# Patient Record
Sex: FEMALE | Race: WHITE | NOT HISPANIC OR LATINO
[De-identification: names, ages, dates, MRNs, and addresses within clinical notes are randomized per-mention and may not be internally consistent; named-entity substitution may affect disease eponyms.]

---

## 2017-10-11 ENCOUNTER — TRANSCRIPTION ENCOUNTER (OUTPATIENT)
Age: 32
End: 2017-10-11

## 2017-10-11 ENCOUNTER — OUTPATIENT (OUTPATIENT)
Dept: OUTPATIENT SERVICES | Facility: HOSPITAL | Age: 32
LOS: 1 days | End: 2017-10-11
Payer: COMMERCIAL

## 2017-10-11 VITALS
WEIGHT: 293 LBS | TEMPERATURE: 97 F | RESPIRATION RATE: 16 BRPM | HEART RATE: 80 BPM | SYSTOLIC BLOOD PRESSURE: 120 MMHG | DIASTOLIC BLOOD PRESSURE: 80 MMHG

## 2017-10-11 DIAGNOSIS — O02.1 MISSED ABORTION: ICD-10-CM

## 2017-10-11 DIAGNOSIS — Z01.818 ENCOUNTER FOR OTHER PREPROCEDURAL EXAMINATION: ICD-10-CM

## 2017-10-11 LAB
ANION GAP SERPL CALC-SCNC: 11 MMOL/L — SIGNIFICANT CHANGE UP (ref 5–17)
BASOPHILS # BLD AUTO: 0 K/UL — SIGNIFICANT CHANGE UP (ref 0–0.2)
BASOPHILS NFR BLD AUTO: 0.2 % — SIGNIFICANT CHANGE UP (ref 0–2)
BLD GP AB SCN SERPL QL: SIGNIFICANT CHANGE UP
BUN SERPL-MCNC: 11 MG/DL — SIGNIFICANT CHANGE UP (ref 8–20)
CALCIUM SERPL-MCNC: 9.2 MG/DL — SIGNIFICANT CHANGE UP (ref 8.6–10.2)
CHLORIDE SERPL-SCNC: 105 MMOL/L — SIGNIFICANT CHANGE UP (ref 98–107)
CO2 SERPL-SCNC: 22 MMOL/L — SIGNIFICANT CHANGE UP (ref 22–29)
CREAT SERPL-MCNC: 0.74 MG/DL — SIGNIFICANT CHANGE UP (ref 0.5–1.3)
EOSINOPHIL # BLD AUTO: 0.2 K/UL — SIGNIFICANT CHANGE UP (ref 0–0.5)
EOSINOPHIL NFR BLD AUTO: 1.8 % — SIGNIFICANT CHANGE UP (ref 0–6)
GLUCOSE SERPL-MCNC: 98 MG/DL — SIGNIFICANT CHANGE UP (ref 70–115)
HCT VFR BLD CALC: 38.3 % — SIGNIFICANT CHANGE UP (ref 37–47)
HGB BLD-MCNC: 13 G/DL — SIGNIFICANT CHANGE UP (ref 12–16)
LYMPHOCYTES # BLD AUTO: 24.3 % — SIGNIFICANT CHANGE UP (ref 20–55)
LYMPHOCYTES # BLD AUTO: 3.1 K/UL — SIGNIFICANT CHANGE UP (ref 1–4.8)
MCHC RBC-ENTMCNC: 28.8 PG — SIGNIFICANT CHANGE UP (ref 27–31)
MCHC RBC-ENTMCNC: 33.9 G/DL — SIGNIFICANT CHANGE UP (ref 32–36)
MCV RBC AUTO: 84.7 FL — SIGNIFICANT CHANGE UP (ref 81–99)
MONOCYTES # BLD AUTO: 0.8 K/UL — SIGNIFICANT CHANGE UP (ref 0–0.8)
MONOCYTES NFR BLD AUTO: 6.5 % — SIGNIFICANT CHANGE UP (ref 3–10)
NEUTROPHILS # BLD AUTO: 8.4 K/UL — HIGH (ref 1.8–8)
NEUTROPHILS NFR BLD AUTO: 66.9 % — SIGNIFICANT CHANGE UP (ref 37–73)
PLATELET # BLD AUTO: 237 K/UL — SIGNIFICANT CHANGE UP (ref 150–400)
POTASSIUM SERPL-MCNC: 4.2 MMOL/L — SIGNIFICANT CHANGE UP (ref 3.5–5.3)
POTASSIUM SERPL-SCNC: 4.2 MMOL/L — SIGNIFICANT CHANGE UP (ref 3.5–5.3)
RBC # BLD: 4.52 M/UL — SIGNIFICANT CHANGE UP (ref 4.4–5.2)
RBC # FLD: 12.9 % — SIGNIFICANT CHANGE UP (ref 11–15.6)
SODIUM SERPL-SCNC: 138 MMOL/L — SIGNIFICANT CHANGE UP (ref 135–145)
TYPE + AB SCN PNL BLD: SIGNIFICANT CHANGE UP
WBC # BLD: 12.6 K/UL — HIGH (ref 4.8–10.8)
WBC # FLD AUTO: 12.6 K/UL — HIGH (ref 4.8–10.8)

## 2017-10-11 PROCEDURE — 86850 RBC ANTIBODY SCREEN: CPT

## 2017-10-11 PROCEDURE — 86901 BLOOD TYPING SEROLOGIC RH(D): CPT

## 2017-10-11 PROCEDURE — 36415 COLL VENOUS BLD VENIPUNCTURE: CPT

## 2017-10-11 PROCEDURE — 85027 COMPLETE CBC AUTOMATED: CPT

## 2017-10-11 PROCEDURE — G0463: CPT

## 2017-10-11 PROCEDURE — 86900 BLOOD TYPING SEROLOGIC ABO: CPT

## 2017-10-11 PROCEDURE — 80048 BASIC METABOLIC PNL TOTAL CA: CPT

## 2017-10-11 RX ORDER — SODIUM CHLORIDE 9 MG/ML
3 INJECTION INTRAMUSCULAR; INTRAVENOUS; SUBCUTANEOUS ONCE
Qty: 0 | Refills: 0 | Status: DISCONTINUED | OUTPATIENT
Start: 2017-10-12 | End: 2017-10-12

## 2017-10-11 NOTE — H&P PST ADULT - NSANTHOSAYNRD_GEN_A_CORE
No. GEE screening performed.  STOP BANG Legend: 0-2 = LOW Risk; 3-4 = INTERMEDIATE Risk; 5-8 = HIGH Risk

## 2017-10-11 NOTE — H&P PST ADULT - HISTORY OF PRESENT ILLNESS
32 year old female presents with c/o of no heart beat on sonogram.  LMP 17 . Pt had sono 2 weeks ago , heartbeat noted but small for size. Repeat sono done 10/ 9/17 and no heartbeat noted. Denies any bleeding or cramping. Denies history of STD. Scheduled for D&C.

## 2017-10-12 ENCOUNTER — RESULT REVIEW (OUTPATIENT)
Age: 32
End: 2017-10-12

## 2017-10-12 ENCOUNTER — OUTPATIENT (OUTPATIENT)
Dept: OUTPATIENT SERVICES | Facility: HOSPITAL | Age: 32
LOS: 1 days | End: 2017-10-12
Payer: COMMERCIAL

## 2017-10-12 VITALS
HEART RATE: 86 BPM | SYSTOLIC BLOOD PRESSURE: 150 MMHG | WEIGHT: 293 LBS | OXYGEN SATURATION: 100 % | DIASTOLIC BLOOD PRESSURE: 91 MMHG | RESPIRATION RATE: 16 BRPM

## 2017-10-12 VITALS
SYSTOLIC BLOOD PRESSURE: 124 MMHG | DIASTOLIC BLOOD PRESSURE: 73 MMHG | RESPIRATION RATE: 16 BRPM | HEART RATE: 67 BPM | OXYGEN SATURATION: 100 %

## 2017-10-12 DIAGNOSIS — O02.1 MISSED ABORTION: ICD-10-CM

## 2017-10-12 DIAGNOSIS — Z01.818 ENCOUNTER FOR OTHER PREPROCEDURAL EXAMINATION: ICD-10-CM

## 2017-10-12 PROCEDURE — 88305 TISSUE EXAM BY PATHOLOGIST: CPT | Mod: 26

## 2017-10-12 PROCEDURE — 59820 CARE OF MISCARRIAGE: CPT

## 2017-10-12 PROCEDURE — 88305 TISSUE EXAM BY PATHOLOGIST: CPT

## 2017-10-12 NOTE — BRIEF OPERATIVE NOTE - PROCEDURE
<<-----Click on this checkbox to enter Procedure Exam under anesthesia, gynecologic  10/12/2017    Active  [email or web address]  Suction curettage of uterus  10/12/2017    Active  [email or web address]

## 2017-10-18 LAB — SURGICAL PATHOLOGY FINAL REPORT - CH: SIGNIFICANT CHANGE UP

## 2018-03-01 ENCOUNTER — EMERGENCY (EMERGENCY)
Facility: HOSPITAL | Age: 33
LOS: 0 days | Discharge: HOME | End: 2018-03-01
Attending: EMERGENCY MEDICINE | Admitting: EMERGENCY MEDICINE
Payer: COMMERCIAL

## 2018-03-01 VITALS
RESPIRATION RATE: 18 BRPM | TEMPERATURE: 98 F | HEART RATE: 100 BPM | OXYGEN SATURATION: 100 % | SYSTOLIC BLOOD PRESSURE: 139 MMHG | DIASTOLIC BLOOD PRESSURE: 99 MMHG

## 2018-03-01 VITALS — HEIGHT: 67 IN | WEIGHT: 293 LBS

## 2018-03-01 DIAGNOSIS — R07.9 CHEST PAIN, UNSPECIFIED: ICD-10-CM

## 2018-03-01 LAB
ALBUMIN SERPL ELPH-MCNC: 4 G/DL — SIGNIFICANT CHANGE UP (ref 3.3–5)
ALP SERPL-CCNC: 67 U/L — SIGNIFICANT CHANGE UP (ref 40–120)
ALT FLD-CCNC: 31 U/L — SIGNIFICANT CHANGE UP (ref 12–78)
ANION GAP SERPL CALC-SCNC: 7 MMOL/L — SIGNIFICANT CHANGE UP (ref 5–17)
APTT BLD: 31 SEC — SIGNIFICANT CHANGE UP (ref 27.5–37.4)
AST SERPL-CCNC: 19 U/L — SIGNIFICANT CHANGE UP (ref 15–37)
BASOPHILS # BLD AUTO: 0.1 K/UL — SIGNIFICANT CHANGE UP (ref 0–0.2)
BASOPHILS NFR BLD AUTO: 1.1 % — SIGNIFICANT CHANGE UP (ref 0–2)
BILIRUB SERPL-MCNC: 0.6 MG/DL — SIGNIFICANT CHANGE UP (ref 0.2–1.2)
BUN SERPL-MCNC: 12 MG/DL — SIGNIFICANT CHANGE UP (ref 7–23)
CALCIUM SERPL-MCNC: 9.5 MG/DL — SIGNIFICANT CHANGE UP (ref 8.5–10.1)
CHLORIDE SERPL-SCNC: 106 MMOL/L — SIGNIFICANT CHANGE UP (ref 96–108)
CO2 SERPL-SCNC: 25 MMOL/L — SIGNIFICANT CHANGE UP (ref 22–31)
CREAT SERPL-MCNC: 0.82 MG/DL — SIGNIFICANT CHANGE UP (ref 0.5–1.3)
EOSINOPHIL # BLD AUTO: 0.2 K/UL — SIGNIFICANT CHANGE UP (ref 0–0.5)
EOSINOPHIL NFR BLD AUTO: 2.6 % — SIGNIFICANT CHANGE UP (ref 0–6)
GLUCOSE SERPL-MCNC: 81 MG/DL — SIGNIFICANT CHANGE UP (ref 70–99)
HCT VFR BLD CALC: 43.6 % — SIGNIFICANT CHANGE UP (ref 34.5–45)
HGB BLD-MCNC: 14.7 G/DL — SIGNIFICANT CHANGE UP (ref 11.5–15.5)
INR BLD: 1.05 RATIO — SIGNIFICANT CHANGE UP (ref 0.88–1.16)
LYMPHOCYTES # BLD AUTO: 2.9 K/UL — SIGNIFICANT CHANGE UP (ref 1–3.3)
LYMPHOCYTES # BLD AUTO: 32.4 % — SIGNIFICANT CHANGE UP (ref 13–44)
MCHC RBC-ENTMCNC: 28 PG — SIGNIFICANT CHANGE UP (ref 27–34)
MCHC RBC-ENTMCNC: 33.7 GM/DL — SIGNIFICANT CHANGE UP (ref 32–36)
MCV RBC AUTO: 83.1 FL — SIGNIFICANT CHANGE UP (ref 80–100)
MONOCYTES # BLD AUTO: 0.6 K/UL — SIGNIFICANT CHANGE UP (ref 0–0.9)
MONOCYTES NFR BLD AUTO: 6.9 % — SIGNIFICANT CHANGE UP (ref 2–14)
NEUTROPHILS # BLD AUTO: 5.2 K/UL — SIGNIFICANT CHANGE UP (ref 1.8–7.4)
NEUTROPHILS NFR BLD AUTO: 57.1 % — SIGNIFICANT CHANGE UP (ref 43–77)
PLATELET # BLD AUTO: 273 K/UL — SIGNIFICANT CHANGE UP (ref 150–400)
POTASSIUM SERPL-MCNC: 4.1 MMOL/L — SIGNIFICANT CHANGE UP (ref 3.5–5.3)
POTASSIUM SERPL-SCNC: 4.1 MMOL/L — SIGNIFICANT CHANGE UP (ref 3.5–5.3)
PROT SERPL-MCNC: 7.9 GM/DL — SIGNIFICANT CHANGE UP (ref 6–8.3)
PROTHROM AB SERPL-ACNC: 11.4 SEC — SIGNIFICANT CHANGE UP (ref 9.8–12.7)
RBC # BLD: 5.25 M/UL — HIGH (ref 3.8–5.2)
RBC # FLD: 12.2 % — SIGNIFICANT CHANGE UP (ref 10.3–14.5)
SODIUM SERPL-SCNC: 138 MMOL/L — SIGNIFICANT CHANGE UP (ref 135–145)
TROPONIN I SERPL-MCNC: <0.015 NG/ML — SIGNIFICANT CHANGE UP (ref 0.01–0.04)
WBC # BLD: 9.1 K/UL — SIGNIFICANT CHANGE UP (ref 3.8–10.5)
WBC # FLD AUTO: 9.1 K/UL — SIGNIFICANT CHANGE UP (ref 3.8–10.5)

## 2018-03-01 PROCEDURE — 71046 X-RAY EXAM CHEST 2 VIEWS: CPT | Mod: 26

## 2018-03-01 PROCEDURE — 93010 ELECTROCARDIOGRAM REPORT: CPT

## 2018-03-01 PROCEDURE — 99285 EMERGENCY DEPT VISIT HI MDM: CPT

## 2018-03-01 RX ORDER — SODIUM CHLORIDE 9 MG/ML
3 INJECTION INTRAMUSCULAR; INTRAVENOUS; SUBCUTANEOUS ONCE
Qty: 0 | Refills: 0 | Status: COMPLETED | OUTPATIENT
Start: 2018-03-01 | End: 2018-03-01

## 2018-03-01 RX ADMIN — SODIUM CHLORIDE 3 MILLILITER(S): 9 INJECTION INTRAMUSCULAR; INTRAVENOUS; SUBCUTANEOUS at 14:14

## 2018-03-01 NOTE — ED STATDOCS - OBJECTIVE STATEMENT
31 y/o f with PMHx of anxiety presenting to the ED c/o CP x2 days. Pt describes pain as tightness and pressure. +indigestion.  Pt took .25 mg Xanex this morning with no relief to sx. Seen by school nurse who stated her BP was elevated. Denies cough, fever, swelling or pain in legs. Nonsmoker, No cardiac FHx, No recent travel, not on any oral contraceptive.

## 2018-03-01 NOTE — ED ADULT NURSE NOTE - DOES PATIENT HAVE ADVANCE DIRECTIVE
Pre-Visit Chart Review  For Appointment Scheduled on 5/3/17.    Health Maintenance Due   Topic Date Due    TETANUS VACCINE  08/11/1994    Pneumococcal PPSV23 (Medium Risk) (1) 08/11/1994    Mammogram  08/11/2016                     
No

## 2018-03-01 NOTE — ED ADULT NURSE NOTE - OBJECTIVE STATEMENT
Pt presents with chest pain since last night. Denies SOB or dizziness. Pt states " on and off for the last week I have been having palpitations"

## 2018-03-01 NOTE — ED STATDOCS - NS_ ATTENDINGSCRIBEDETAILS _ED_A_ED_FT
I, Eugene Caceres MD,  performed the initial face to face bedside interview with this patient regarding history of present illness, review of symptoms and relevant past medical, social and family history.  I completed an independent physical examination.    The history, relevant review of systems, past medical and surgical history, medical decision making, and physical examination was documented by the scribe in my presence and I attest to the accuracy of the documentation.

## 2018-03-01 NOTE — ED STATDOCS - ATTENDING CONTRIBUTION TO CARE
I, Eugene Caceres MD,  performed the initial face to face bedside interview with this patient regarding history of present illness, review of symptoms and relevant past medical, social and family history.  I completed an independent physical examination.  I was the initial provider who evaluated this patient. I have signed out the follow up of any pending tests (i.e. labs, radiological studies) to the ACP.  I have communicated the patient’s plan of care and disposition with the ACP.

## 2018-03-01 NOTE — ED STATDOCS - PROGRESS NOTE DETAILS
VENESSA Lund:   Patient has been seen, evaluated and orders have been written by the attending in intake. Patient is stable.  I will follow up the results of orders written and I will continue to evaluate/observe the patient.  Will obtain labs/CXR/reassess.  Marcelina Lund PA-C Reevaluated patient at bedside.  Patient feeling much improved.  Discussed the results of all diagnostic testing in ED and copies of all reports given.   An opportunity to ask questions was given.  Discussed the importance of prompt, close medical follow-up.  Patient will return with any changes, concerns or persistent / worsening symptoms.  Understanding of all instructions verbalized.  Pt. to follow up with cardiology.  Marcelina Lund PA-C

## 2018-06-01 PROBLEM — Z00.00 ENCOUNTER FOR PREVENTIVE HEALTH EXAMINATION: Noted: 2018-06-01

## 2018-07-27 ENCOUNTER — APPOINTMENT (OUTPATIENT)
Dept: DERMATOLOGY | Facility: CLINIC | Age: 33
End: 2018-07-27
Payer: COMMERCIAL

## 2018-07-27 VITALS — WEIGHT: 293 LBS | BODY MASS INDEX: 45.99 KG/M2 | HEIGHT: 67 IN

## 2018-07-27 DIAGNOSIS — D22.5 MELANOCYTIC NEVI OF TRUNK: ICD-10-CM

## 2018-07-27 DIAGNOSIS — D18.00 HEMANGIOMA UNSPECIFIED SITE: ICD-10-CM

## 2018-07-27 DIAGNOSIS — D23.9 OTHER BENIGN NEOPLASM OF SKIN, UNSPECIFIED: ICD-10-CM

## 2018-07-27 PROCEDURE — 99203 OFFICE O/P NEW LOW 30 MIN: CPT

## 2018-09-11 ENCOUNTER — ASOB RESULT (OUTPATIENT)
Age: 33
End: 2018-09-11

## 2018-09-11 ENCOUNTER — APPOINTMENT (OUTPATIENT)
Dept: ANTEPARTUM | Facility: CLINIC | Age: 33
End: 2018-09-11

## 2018-09-11 ENCOUNTER — APPOINTMENT (OUTPATIENT)
Dept: MATERNAL FETAL MEDICINE | Facility: CLINIC | Age: 33
End: 2018-09-11
Payer: COMMERCIAL

## 2018-09-11 ENCOUNTER — APPOINTMENT (OUTPATIENT)
Dept: ANTEPARTUM | Facility: CLINIC | Age: 33
End: 2018-09-11
Payer: COMMERCIAL

## 2018-09-11 VITALS
BODY MASS INDEX: 45.99 KG/M2 | SYSTOLIC BLOOD PRESSURE: 132 MMHG | HEIGHT: 67 IN | DIASTOLIC BLOOD PRESSURE: 68 MMHG | WEIGHT: 293 LBS

## 2018-09-11 DIAGNOSIS — Z78.9 OTHER SPECIFIED HEALTH STATUS: ICD-10-CM

## 2018-09-11 DIAGNOSIS — Z82.49 FAMILY HISTORY OF ISCHEMIC HEART DISEASE AND OTHER DISEASES OF THE CIRCULATORY SYSTEM: ICD-10-CM

## 2018-09-11 DIAGNOSIS — Z83.49 FAMILY HISTORY OF OTHER ENDOCRINE, NUTRITIONAL AND METABOLIC DISEASES: ICD-10-CM

## 2018-09-11 PROCEDURE — 76811 OB US DETAILED SNGL FETUS: CPT

## 2018-09-11 PROCEDURE — 76817 TRANSVAGINAL US OBSTETRIC: CPT

## 2018-09-11 PROCEDURE — 99243 OFF/OP CNSLTJ NEW/EST LOW 30: CPT

## 2018-09-19 ENCOUNTER — APPOINTMENT (OUTPATIENT)
Dept: MATERNAL FETAL MEDICINE | Facility: CLINIC | Age: 33
End: 2018-09-19

## 2018-09-19 ENCOUNTER — APPOINTMENT (OUTPATIENT)
Dept: ANTEPARTUM | Facility: CLINIC | Age: 33
End: 2018-09-19

## 2018-10-08 ENCOUNTER — APPOINTMENT (OUTPATIENT)
Dept: ANTEPARTUM | Facility: CLINIC | Age: 33
End: 2018-10-08

## 2018-10-08 ENCOUNTER — APPOINTMENT (OUTPATIENT)
Dept: PEDIATRIC CARDIOLOGY | Facility: CLINIC | Age: 33
End: 2018-10-08
Payer: COMMERCIAL

## 2018-10-08 ENCOUNTER — APPOINTMENT (OUTPATIENT)
Dept: MATERNAL FETAL MEDICINE | Facility: CLINIC | Age: 33
End: 2018-10-08

## 2018-10-08 PROCEDURE — 76827 ECHO EXAM OF FETAL HEART: CPT

## 2018-10-08 PROCEDURE — 76825 ECHO EXAM OF FETAL HEART: CPT

## 2018-10-08 PROCEDURE — 99203 OFFICE O/P NEW LOW 30 MIN: CPT | Mod: 25

## 2018-10-08 PROCEDURE — 93325 DOPPLER ECHO COLOR FLOW MAPG: CPT

## 2018-11-06 ENCOUNTER — APPOINTMENT (OUTPATIENT)
Dept: ANTEPARTUM | Facility: CLINIC | Age: 33
End: 2018-11-06
Payer: COMMERCIAL

## 2018-11-06 ENCOUNTER — ASOB RESULT (OUTPATIENT)
Age: 33
End: 2018-11-06

## 2018-11-06 PROCEDURE — 76816 OB US FOLLOW-UP PER FETUS: CPT

## 2018-12-14 ENCOUNTER — RECORD ABSTRACTING (OUTPATIENT)
Age: 33
End: 2018-12-14

## 2018-12-14 DIAGNOSIS — L68.0 HIRSUTISM: ICD-10-CM

## 2018-12-14 DIAGNOSIS — Z87.59 PERSONAL HISTORY OF OTHER COMPLICATIONS OF PREGNANCY, CHILDBIRTH AND THE PUERPERIUM: ICD-10-CM

## 2018-12-14 DIAGNOSIS — Z80.8 FAMILY HISTORY OF MALIGNANT NEOPLASM OF OTHER ORGANS OR SYSTEMS: ICD-10-CM

## 2018-12-14 DIAGNOSIS — M72.2 PLANTAR FASCIAL FIBROMATOSIS: ICD-10-CM

## 2018-12-14 LAB — CYTOLOGY CVX/VAG DOC THIN PREP: NORMAL

## 2018-12-17 ENCOUNTER — EMERGENCY (EMERGENCY)
Facility: HOSPITAL | Age: 33
LOS: 1 days | Discharge: HOME | End: 2018-12-17
Attending: EMERGENCY MEDICINE
Payer: COMMERCIAL

## 2018-12-17 ENCOUNTER — APPOINTMENT (OUTPATIENT)
Dept: OBGYN | Facility: CLINIC | Age: 33
End: 2018-12-17
Payer: COMMERCIAL

## 2018-12-17 ENCOUNTER — APPOINTMENT (OUTPATIENT)
Dept: OBGYN | Facility: CLINIC | Age: 33
End: 2018-12-17

## 2018-12-17 VITALS
HEIGHT: 67 IN | SYSTOLIC BLOOD PRESSURE: 124 MMHG | DIASTOLIC BLOOD PRESSURE: 80 MMHG | BODY MASS INDEX: 45.99 KG/M2 | WEIGHT: 293 LBS

## 2018-12-17 VITALS
HEIGHT: 67 IN | TEMPERATURE: 98 F | HEART RATE: 84 BPM | WEIGHT: 293 LBS | DIASTOLIC BLOOD PRESSURE: 64 MMHG | SYSTOLIC BLOOD PRESSURE: 122 MMHG | OXYGEN SATURATION: 99 % | RESPIRATION RATE: 20 BRPM

## 2018-12-17 LAB
ANION GAP SERPL CALC-SCNC: 11 MMOL/L — SIGNIFICANT CHANGE UP (ref 5–17)
BASOPHILS # BLD AUTO: 0 K/UL — SIGNIFICANT CHANGE UP (ref 0–0.2)
BASOPHILS NFR BLD AUTO: 0.2 % — SIGNIFICANT CHANGE UP (ref 0–2)
BILIRUB UR QL STRIP: NORMAL
BUN SERPL-MCNC: 8 MG/DL — SIGNIFICANT CHANGE UP (ref 8–20)
CALCIUM SERPL-MCNC: 8.8 MG/DL — SIGNIFICANT CHANGE UP (ref 8.6–10.2)
CHLORIDE SERPL-SCNC: 104 MMOL/L — SIGNIFICANT CHANGE UP (ref 98–107)
CO2 SERPL-SCNC: 22 MMOL/L — SIGNIFICANT CHANGE UP (ref 22–29)
COLLECTION METHOD: NORMAL
CREAT SERPL-MCNC: 0.55 MG/DL — SIGNIFICANT CHANGE UP (ref 0.5–1.3)
EOSINOPHIL # BLD AUTO: 0.2 K/UL — SIGNIFICANT CHANGE UP (ref 0–0.5)
EOSINOPHIL NFR BLD AUTO: 1.8 % — SIGNIFICANT CHANGE UP (ref 0–6)
GLUCOSE SERPL-MCNC: 107 MG/DL — SIGNIFICANT CHANGE UP (ref 70–115)
GLUCOSE UR-MCNC: NORMAL
HCG UR QL: 0.2 EU/DL
HCT VFR BLD CALC: 32.1 % — LOW (ref 37–47)
HGB BLD-MCNC: 10.3 G/DL — LOW (ref 12–16)
HGB UR QL STRIP.AUTO: NORMAL
KETONES UR-MCNC: NORMAL
LEUKOCYTE ESTERASE UR QL STRIP: ABNORMAL
LYMPHOCYTES # BLD AUTO: 1.8 K/UL — SIGNIFICANT CHANGE UP (ref 1–4.8)
LYMPHOCYTES # BLD AUTO: 19.7 % — LOW (ref 20–55)
MCHC RBC-ENTMCNC: 27 PG — SIGNIFICANT CHANGE UP (ref 27–31)
MCHC RBC-ENTMCNC: 32.1 G/DL — SIGNIFICANT CHANGE UP (ref 32–36)
MCV RBC AUTO: 84 FL — SIGNIFICANT CHANGE UP (ref 81–99)
MONOCYTES # BLD AUTO: 0.7 K/UL — SIGNIFICANT CHANGE UP (ref 0–0.8)
MONOCYTES NFR BLD AUTO: 7.4 % — SIGNIFICANT CHANGE UP (ref 3–10)
NEUTROPHILS # BLD AUTO: 6.6 K/UL — SIGNIFICANT CHANGE UP (ref 1.8–8)
NEUTROPHILS NFR BLD AUTO: 70.4 % — SIGNIFICANT CHANGE UP (ref 37–73)
NITRITE UR QL STRIP: NORMAL
PH UR STRIP: 7
PLATELET # BLD AUTO: 218 K/UL — SIGNIFICANT CHANGE UP (ref 150–400)
POTASSIUM SERPL-MCNC: 4 MMOL/L — SIGNIFICANT CHANGE UP (ref 3.5–5.3)
POTASSIUM SERPL-SCNC: 4 MMOL/L — SIGNIFICANT CHANGE UP (ref 3.5–5.3)
PROT UR STRIP-MCNC: NORMAL
RBC # BLD: 3.82 M/UL — LOW (ref 4.4–5.2)
RBC # FLD: 13.7 % — SIGNIFICANT CHANGE UP (ref 11–15.6)
SODIUM SERPL-SCNC: 137 MMOL/L — SIGNIFICANT CHANGE UP (ref 135–145)
SP GR UR STRIP: 1.01
T3 SERPL-MCNC: 175 NG/DL — SIGNIFICANT CHANGE UP (ref 80–200)
T4 AB SER-ACNC: 7.5 UG/DL — SIGNIFICANT CHANGE UP (ref 4.5–12)
TSH SERPL-MCNC: 1.64 UIU/ML — SIGNIFICANT CHANGE UP (ref 0.27–4.2)
WBC # BLD: 9.3 K/UL — SIGNIFICANT CHANGE UP (ref 4.8–10.8)
WBC # FLD AUTO: 9.3 K/UL — SIGNIFICANT CHANGE UP (ref 4.8–10.8)

## 2018-12-17 PROCEDURE — 0502F SUBSEQUENT PRENATAL CARE: CPT

## 2018-12-17 PROCEDURE — 99284 EMERGENCY DEPT VISIT MOD MDM: CPT | Mod: 25

## 2018-12-17 PROCEDURE — 70551 MRI BRAIN STEM W/O DYE: CPT | Mod: 26

## 2018-12-17 PROCEDURE — 85027 COMPLETE CBC AUTOMATED: CPT

## 2018-12-17 PROCEDURE — 84443 ASSAY THYROID STIM HORMONE: CPT

## 2018-12-17 PROCEDURE — 36415 COLL VENOUS BLD VENIPUNCTURE: CPT

## 2018-12-17 PROCEDURE — 96375 TX/PRO/DX INJ NEW DRUG ADDON: CPT

## 2018-12-17 PROCEDURE — 70551 MRI BRAIN STEM W/O DYE: CPT

## 2018-12-17 PROCEDURE — 99284 EMERGENCY DEPT VISIT MOD MDM: CPT

## 2018-12-17 PROCEDURE — 84436 ASSAY OF TOTAL THYROXINE: CPT

## 2018-12-17 PROCEDURE — 96374 THER/PROPH/DIAG INJ IV PUSH: CPT

## 2018-12-17 PROCEDURE — 81003 URINALYSIS AUTO W/O SCOPE: CPT | Mod: QW

## 2018-12-17 PROCEDURE — 80048 BASIC METABOLIC PNL TOTAL CA: CPT

## 2018-12-17 PROCEDURE — 84480 ASSAY TRIIODOTHYRONINE (T3): CPT

## 2018-12-17 RX ORDER — DIPHENHYDRAMINE HCL 50 MG
25 CAPSULE ORAL ONCE
Qty: 0 | Refills: 0 | Status: COMPLETED | OUTPATIENT
Start: 2018-12-17 | End: 2018-12-17

## 2018-12-17 RX ORDER — SODIUM CHLORIDE 9 MG/ML
1000 INJECTION INTRAMUSCULAR; INTRAVENOUS; SUBCUTANEOUS ONCE
Qty: 0 | Refills: 0 | Status: COMPLETED | OUTPATIENT
Start: 2018-12-17 | End: 2018-12-17

## 2018-12-17 RX ORDER — METOCLOPRAMIDE HCL 10 MG
10 TABLET ORAL ONCE
Qty: 0 | Refills: 0 | Status: COMPLETED | OUTPATIENT
Start: 2018-12-17 | End: 2018-12-17

## 2018-12-17 RX ADMIN — SODIUM CHLORIDE 1000 MILLILITER(S): 9 INJECTION INTRAMUSCULAR; INTRAVENOUS; SUBCUTANEOUS at 16:56

## 2018-12-17 RX ADMIN — Medication 10 MILLIGRAM(S): at 16:56

## 2018-12-17 RX ADMIN — Medication 25 MILLIGRAM(S): at 16:56

## 2018-12-17 NOTE — ED STATDOCS - ATTENDING CONTRIBUTION TO CARE
I, Kyree Story, performed the initial face to face bedside interview with this patient regarding history of present illness, review of symptoms and relevant past medical, social and family history.  I completed an independent physical examination.  I was the initial provider who evaluated this patient. I have signed out the follow up of any pending tests (i.e. labs, radiological studies) to the ACP.  I have communicated the patient’s plan of care and disposition with the ACP.

## 2018-12-17 NOTE — ED STATDOCS - MEDICAL DECISION MAKING DETAILS
will treat symptomatically for HA with Reglan and Benadryl, check labs and MRI of brain, re evaluate will treat symptomatically for HA with Reglan and Benadryl, check labs and MRI of brain, re evaluate. neurologist f/u as out pt.

## 2018-12-17 NOTE — ED STATDOCS - OBJECTIVE STATEMENT
34 y/o F pt presents to ED c/o intermittent HA that onset 3 days ago. Pt is 34 weeks pregnant. She notes the HA is intensified with coughing, sneezing, standing up, and lasts for 5 minutes; however, when the HA goes away, she describes the pain as "dull" lasting 2-3 hours. Notes the HA is located in the left occipital region Pt saw OBGYN and was referred to ED. Reports she currently has mild HA. She has never experienced these sx before, no hx of migraine or seizure. She is a non-smoker, no EtOH. Pt took Tylenol this morning at 09:00 which provided no relief. Denies hx thyroid problems, DM, HTN, FHx of CVA,  generalized weakness, difficulty speaking, blurred vision, fever, chills. No further acute complaints at this time. 34 y/o F pt presents to ED c/o intermittent HA that onset 3 days ago. Pt is 34 weeks pregnant. She notes the HA is intensified with coughing, sneezing, standing up, and lasts for 5 minutes; however, when the HA goes away, she describes the pain as "dull" lasting 2-3 hours. Notes the HA is located in the left occipital region Pt saw OBGYN and was referred to ED. Reports she currently has mild HA. She has never experienced these sx before, no hx of migraine or seizure. She is a non-smoker, no EtOH. Pt took Tylenol this morning at 09:00 which provided no relief. Denies hx thyroid problems, DM, HTN, FHx of CVA, generalized weakness, difficulty speaking, blurred vision, fever, chills. No further acute complaints at this time. 32 y/o F pt presents to ED c/o intermittent HA that onset 3 days ago. Pt is 34 weeks pregnant. She notes the HA is intensified with coughing, sneezing, standing up, and lasts for 5 minutes; however, when the HA goes away, she describes the pain as "dull" lasting 2-3 hours. Notes the HA is located in the left occipital region. Pt saw OBGYN and was referred to ED. Reports she currently has mild HA. She has never experienced these sx before, no hx of migraine or seizure. She is a non-smoker, no EtOH. Pt took Tylenol this morning at 09:00 which provided no relief. Denies hx thyroid problems, DM, HTN, FHx of CVA, generalized weakness, difficulty speaking, blurred vision, fever, chills. No further acute complaints at this time.

## 2018-12-17 NOTE — ED STATDOCS - NEUROLOGICAL, MLM
sensation is normal and strength is normal. sensation is normal and strength is normal. CN 2-12 GI, FTN Intact, no visual field deficit, patella refex +0, no pronator drift,

## 2018-12-17 NOTE — ED ADULT NURSE NOTE - NSIMPLEMENTINTERV_GEN_ALL_ED
Implemented All Universal Safety Interventions:  [place] to call system. Call bell, personal items and telephone within reach. Instruct patient to call for assistance. Room bathroom lighting operational. Non-slip footwear when patient is off stretcher. Physically safe environment: no spills, clutter or unnecessary equipment. Stretcher in lowest position, wheels locked, appropriate side rails in place.

## 2018-12-17 NOTE — ED ADULT TRIAGE NOTE - CHIEF COMPLAINT QUOTE
34 weeks pregnant and headaches. No pregnancy or BP problems in the office. I called L & [facility], they said medical clear first. MD sent for possible brain CT. Started Friday. Taking Tylenol. Has had a cough and congestion also

## 2018-12-17 NOTE — ED ADULT NURSE NOTE - OBJECTIVE STATEMENT
pt A+Ox3, no apparent distress noted. pt A+Ox3, no apparent distress noted. pt states 34 wks gestation, no complications during pregnancy. pt states she has had a headache x3 days, worsens when she coughs or sneezes. pt went to her OB/GYN today and was sent here, pt states headache is unrelated to pregnancy.

## 2018-12-17 NOTE — ED STATDOCS - PROGRESS NOTE DETAILS
pt is inially seen by Dr trent -agreed with Hx-ROS-PE and plan- Will F/u with result   pt now rated the headache 1-2 /10

## 2019-01-03 ENCOUNTER — APPOINTMENT (OUTPATIENT)
Dept: OBGYN | Facility: CLINIC | Age: 34
End: 2019-01-03

## 2019-01-03 VITALS — BODY MASS INDEX: 53.45 KG/M2 | WEIGHT: 293 LBS

## 2019-01-07 ENCOUNTER — OTHER (OUTPATIENT)
Age: 34
End: 2019-01-07

## 2019-01-07 ENCOUNTER — ASOB RESULT (OUTPATIENT)
Age: 34
End: 2019-01-07

## 2019-01-07 ENCOUNTER — APPOINTMENT (OUTPATIENT)
Dept: OBGYN | Facility: CLINIC | Age: 34
End: 2019-01-07
Payer: COMMERCIAL

## 2019-01-07 ENCOUNTER — TRANSCRIPTION ENCOUNTER (OUTPATIENT)
Age: 34
End: 2019-01-07

## 2019-01-07 VITALS
BODY MASS INDEX: 45.99 KG/M2 | WEIGHT: 293 LBS | HEIGHT: 67 IN | SYSTOLIC BLOOD PRESSURE: 120 MMHG | DIASTOLIC BLOOD PRESSURE: 80 MMHG

## 2019-01-07 LAB
B-HEM STREP SPEC QL CULT: NORMAL
BILIRUB UR QL STRIP: NORMAL
BILIRUB UR QL STRIP: NORMAL
CLARITY UR: CLEAR
COLLECTION METHOD: NORMAL
COLLECTION METHOD: NORMAL
GLUCOSE UR-MCNC: NORMAL
GLUCOSE UR-MCNC: NORMAL
HCG UR QL: 0.2 EU/DL
HCG UR QL: 0.2 EU/DL
HGB UR QL STRIP.AUTO: NORMAL
HGB UR QL STRIP.AUTO: NORMAL
HIV1+2 AB SPEC QL IA.RAPID: NONREACTIVE
KETONES UR-MCNC: NORMAL
KETONES UR-MCNC: NORMAL
LEUKOCYTE ESTERASE UR QL STRIP: ABNORMAL
LEUKOCYTE ESTERASE UR QL STRIP: ABNORMAL
NITRITE UR QL STRIP: NORMAL
NITRITE UR QL STRIP: NORMAL
PH UR STRIP: 6.5
PH UR STRIP: 7
PROT UR STRIP-MCNC: NORMAL
PROT UR STRIP-MCNC: NORMAL
SP GR UR STRIP: 1.02
SP GR UR STRIP: 1.02

## 2019-01-07 PROCEDURE — 76818 FETAL BIOPHYS PROFILE W/NST: CPT

## 2019-01-07 PROCEDURE — 0502F SUBSEQUENT PRENATAL CARE: CPT

## 2019-01-08 ENCOUNTER — OTHER (OUTPATIENT)
Age: 34
End: 2019-01-08

## 2019-01-08 ENCOUNTER — OUTPATIENT (OUTPATIENT)
Dept: OUTPATIENT SERVICES | Facility: HOSPITAL | Age: 34
LOS: 1 days | End: 2019-01-08
Payer: COMMERCIAL

## 2019-01-08 VITALS — SYSTOLIC BLOOD PRESSURE: 137 MMHG | DIASTOLIC BLOOD PRESSURE: 61 MMHG | HEART RATE: 74 BPM

## 2019-01-08 VITALS — SYSTOLIC BLOOD PRESSURE: 131 MMHG | HEART RATE: 75 BPM | DIASTOLIC BLOOD PRESSURE: 62 MMHG

## 2019-01-08 DIAGNOSIS — O47.1 FALSE LABOR AT OR AFTER 37 COMPLETED WEEKS OF GESTATION: ICD-10-CM

## 2019-01-08 PROCEDURE — 59025 FETAL NON-STRESS TEST: CPT

## 2019-01-08 PROCEDURE — G0463: CPT

## 2019-01-08 NOTE — OB PROVIDER TRIAGE NOTE - NSOBPROVIDERNOTE_OBGYN_ALL_OB_FT
34 yo  @37w2d s/p fall.  Cat 1 tracing - maternal-fetal status reassuring  - F/u sonogram tomorrow at Dr. Wakefield's office    D/W Dr. Linares 34 yo  @37w2d s/p fall.  Cat 1 tracing - maternal-fetal status reassuring  - F/u sonogram tomorrow at Dr. Wakefield's office    D/W Dr. Linares    Addendum: I examined Floridalma and she has a grade 1 sprain to the left ankle. She claims that this is a "weak ankle" and she states that she has had issues in the past with it "giving out". I discussed her current condition which combined her elevated BMI and pregnancy with an altered center of gravity. She did not impact her abdomen in the fall and she reports that the fetal activity has been normal. She denies any discharge or vaginal bleeding. Her FHR tracing is stable. She has no contractions on the toco.   I recommended that she have her ankle examined for a possible splint in the ED but she is refusing this saying she will ice the ankle and keep it elevated. I reinforced the need for her to watch for increased swelling or ecchymoses. And she will report to the ED if her ankle pain worsens. She has an appointment with FERNANDO for EFW tomorrow.

## 2019-01-08 NOTE — OB PROVIDER TRIAGE NOTE - HISTORY OF PRESENT ILLNESS
32 yo  @37w2d presenting day after she fell in parking lot while leaving her OBGYN office last night. Pt states her left ankle gave out and she fell on her right knee/hip with no trauma to the abdomen. She is currently in no pain aside from some slight soreness on her right ankle Denies vaginal bleeding, loss of fluids or contractions. +Fetal movements. Denies fever, CP, SOB, abdominal pain, n/v/d/c or dysuria.   rCS scheduled for 19.    PMH: Anxiety  PSH:  ()  Meds: PNV  Allergies: none  SH: Denies EtOH/tobacco/illicit drugs.

## 2019-01-08 NOTE — OB PROVIDER TRIAGE NOTE - NSHPPHYSICALEXAM_GEN_ALL_CORE
T(C): 36.8 (01-08-19 @ 15:17), Max: 36.8 (01-08-19 @ 15:17)  T(F): 98.2 (01-08-19 @ 15:17), Max: 98.2 (01-08-19 @ 15:17)  HR: 74 (01-08-19 @ 15:17) (74 - 74)  BP: 137/61 (01-08-19 @ 15:17) (137/61 - 137/61)  RR: 20 (01-08-19 @ 15:17) (20 - 20)    Gen: No apparent distress. Comfortable.  FHT: Category I  Ctx: none

## 2019-01-09 ENCOUNTER — APPOINTMENT (OUTPATIENT)
Dept: ANTEPARTUM | Facility: CLINIC | Age: 34
End: 2019-01-09
Payer: COMMERCIAL

## 2019-01-09 ENCOUNTER — ASOB RESULT (OUTPATIENT)
Age: 34
End: 2019-01-09

## 2019-01-09 PROCEDURE — 76816 OB US FOLLOW-UP PER FETUS: CPT

## 2019-01-10 ENCOUNTER — OUTPATIENT (OUTPATIENT)
Dept: OUTPATIENT SERVICES | Facility: HOSPITAL | Age: 34
LOS: 1 days | End: 2019-01-10
Payer: COMMERCIAL

## 2019-01-10 DIAGNOSIS — O34.219 MATERNAL CARE FOR UNSPECIFIED TYPE SCAR FROM PREVIOUS CESAREAN DELIVERY: ICD-10-CM

## 2019-01-10 DIAGNOSIS — Z01.818 ENCOUNTER FOR OTHER PREPROCEDURAL EXAMINATION: ICD-10-CM

## 2019-01-10 LAB
ANION GAP SERPL CALC-SCNC: 14 MMOL/L — SIGNIFICANT CHANGE UP (ref 5–17)
APPEARANCE UR: CLEAR — SIGNIFICANT CHANGE UP
BASOPHILS # BLD AUTO: 0 K/UL — SIGNIFICANT CHANGE UP (ref 0–0.2)
BASOPHILS NFR BLD AUTO: 0.3 % — SIGNIFICANT CHANGE UP (ref 0–2)
BILIRUB UR-MCNC: NEGATIVE — SIGNIFICANT CHANGE UP
BLD GP AB SCN SERPL QL: SIGNIFICANT CHANGE UP
BUN SERPL-MCNC: 9 MG/DL — SIGNIFICANT CHANGE UP (ref 8–20)
CALCIUM SERPL-MCNC: 9 MG/DL — SIGNIFICANT CHANGE UP (ref 8.6–10.2)
CHLORIDE SERPL-SCNC: 102 MMOL/L — SIGNIFICANT CHANGE UP (ref 98–107)
CO2 SERPL-SCNC: 21 MMOL/L — LOW (ref 22–29)
COLOR SPEC: YELLOW — SIGNIFICANT CHANGE UP
CREAT SERPL-MCNC: 0.5 MG/DL — SIGNIFICANT CHANGE UP (ref 0.5–1.3)
DIFF PNL FLD: NEGATIVE — SIGNIFICANT CHANGE UP
EOSINOPHIL # BLD AUTO: 0.2 K/UL — SIGNIFICANT CHANGE UP (ref 0–0.5)
EOSINOPHIL NFR BLD AUTO: 1.6 % — SIGNIFICANT CHANGE UP (ref 0–6)
EPI CELLS # UR: ABNORMAL
GLUCOSE SERPL-MCNC: 63 MG/DL — LOW (ref 70–115)
GLUCOSE UR QL: NEGATIVE MG/DL — SIGNIFICANT CHANGE UP
HCT VFR BLD CALC: 34.7 % — LOW (ref 37–47)
HGB BLD-MCNC: 11.6 G/DL — LOW (ref 12–16)
KETONES UR-MCNC: NEGATIVE — SIGNIFICANT CHANGE UP
LEUKOCYTE ESTERASE UR-ACNC: ABNORMAL
LYMPHOCYTES # BLD AUTO: 2.5 K/UL — SIGNIFICANT CHANGE UP (ref 1–4.8)
LYMPHOCYTES # BLD AUTO: 21.8 % — SIGNIFICANT CHANGE UP (ref 20–55)
MCHC RBC-ENTMCNC: 27.5 PG — SIGNIFICANT CHANGE UP (ref 27–31)
MCHC RBC-ENTMCNC: 33.4 G/DL — SIGNIFICANT CHANGE UP (ref 32–36)
MCV RBC AUTO: 82.2 FL — SIGNIFICANT CHANGE UP (ref 81–99)
MONOCYTES # BLD AUTO: 1.4 K/UL — HIGH (ref 0–0.8)
MONOCYTES NFR BLD AUTO: 11.9 % — HIGH (ref 3–10)
NEUTROPHILS # BLD AUTO: 7.3 K/UL — SIGNIFICANT CHANGE UP (ref 1.8–8)
NEUTROPHILS NFR BLD AUTO: 63.9 % — SIGNIFICANT CHANGE UP (ref 37–73)
NITRITE UR-MCNC: NEGATIVE — SIGNIFICANT CHANGE UP
PH UR: 6.5 — SIGNIFICANT CHANGE UP (ref 5–8)
PLATELET # BLD AUTO: 210 K/UL — SIGNIFICANT CHANGE UP (ref 150–400)
POTASSIUM SERPL-MCNC: 4.7 MMOL/L — SIGNIFICANT CHANGE UP (ref 3.5–5.3)
POTASSIUM SERPL-SCNC: 4.7 MMOL/L — SIGNIFICANT CHANGE UP (ref 3.5–5.3)
PROT UR-MCNC: NEGATIVE MG/DL — SIGNIFICANT CHANGE UP
RBC # BLD: 4.22 M/UL — LOW (ref 4.4–5.2)
RBC # FLD: 14.1 % — SIGNIFICANT CHANGE UP (ref 11–15.6)
RBC CASTS # UR COMP ASSIST: SIGNIFICANT CHANGE UP /HPF (ref 0–4)
SODIUM SERPL-SCNC: 137 MMOL/L — SIGNIFICANT CHANGE UP (ref 135–145)
SP GR SPEC: 1.02 — SIGNIFICANT CHANGE UP (ref 1.01–1.02)
TYPE + AB SCN PNL BLD: SIGNIFICANT CHANGE UP
UROBILINOGEN FLD QL: NEGATIVE MG/DL — SIGNIFICANT CHANGE UP
WBC # BLD: 11.5 K/UL — HIGH (ref 4.8–10.8)
WBC # FLD AUTO: 11.5 K/UL — HIGH (ref 4.8–10.8)
WBC UR QL: ABNORMAL

## 2019-01-10 PROCEDURE — 80048 BASIC METABOLIC PNL TOTAL CA: CPT

## 2019-01-10 PROCEDURE — 86901 BLOOD TYPING SEROLOGIC RH(D): CPT

## 2019-01-10 PROCEDURE — 86900 BLOOD TYPING SEROLOGIC ABO: CPT

## 2019-01-10 PROCEDURE — 36415 COLL VENOUS BLD VENIPUNCTURE: CPT

## 2019-01-10 PROCEDURE — 85027 COMPLETE CBC AUTOMATED: CPT

## 2019-01-10 PROCEDURE — 81001 URINALYSIS AUTO W/SCOPE: CPT

## 2019-01-10 PROCEDURE — 86850 RBC ANTIBODY SCREEN: CPT

## 2019-01-14 ENCOUNTER — ASOB RESULT (OUTPATIENT)
Age: 34
End: 2019-01-14

## 2019-01-14 ENCOUNTER — APPOINTMENT (OUTPATIENT)
Dept: OBGYN | Facility: CLINIC | Age: 34
End: 2019-01-14
Payer: COMMERCIAL

## 2019-01-14 VITALS
SYSTOLIC BLOOD PRESSURE: 106 MMHG | WEIGHT: 293 LBS | DIASTOLIC BLOOD PRESSURE: 68 MMHG | HEIGHT: 67 IN | BODY MASS INDEX: 45.99 KG/M2

## 2019-01-14 DIAGNOSIS — Z3A.20 20 WEEKS GESTATION OF PREGNANCY: ICD-10-CM

## 2019-01-14 LAB
BILIRUB UR QL STRIP: NORMAL
GLUCOSE UR-MCNC: NORMAL
HCG UR QL: 0.2 EU/DL
HGB UR QL STRIP.AUTO: NORMAL
KETONES UR-MCNC: NORMAL
LEUKOCYTE ESTERASE UR QL STRIP: ABNORMAL
NITRITE UR QL STRIP: NORMAL
PH UR STRIP: 6
PROT UR STRIP-MCNC: NORMAL
SP GR UR STRIP: >=1.03

## 2019-01-14 PROCEDURE — 76818 FETAL BIOPHYS PROFILE W/NST: CPT

## 2019-01-14 PROCEDURE — 0502F SUBSEQUENT PRENATAL CARE: CPT

## 2019-01-16 ENCOUNTER — OTHER (OUTPATIENT)
Age: 34
End: 2019-01-16

## 2019-01-21 ENCOUNTER — TRANSCRIPTION ENCOUNTER (OUTPATIENT)
Age: 34
End: 2019-01-21

## 2019-01-22 ENCOUNTER — APPOINTMENT (OUTPATIENT)
Dept: OBGYN | Facility: HOSPITAL | Age: 34
End: 2019-01-22

## 2019-01-22 ENCOUNTER — INPATIENT (INPATIENT)
Facility: HOSPITAL | Age: 34
LOS: 2 days | Discharge: HOME | End: 2019-01-25
Attending: SPECIALIST | Admitting: SPECIALIST
Payer: COMMERCIAL

## 2019-01-22 VITALS
WEIGHT: 293 LBS | DIASTOLIC BLOOD PRESSURE: 83 MMHG | SYSTOLIC BLOOD PRESSURE: 137 MMHG | TEMPERATURE: 98 F | HEART RATE: 85 BPM | RESPIRATION RATE: 16 BRPM | HEIGHT: 67 IN

## 2019-01-22 DIAGNOSIS — O34.219 MATERNAL CARE FOR UNSPECIFIED TYPE SCAR FROM PREVIOUS CESAREAN DELIVERY: ICD-10-CM

## 2019-01-22 LAB
BASOPHILS # BLD AUTO: 0 K/UL — SIGNIFICANT CHANGE UP (ref 0–0.2)
BASOPHILS NFR BLD AUTO: 0.2 % — SIGNIFICANT CHANGE UP (ref 0–2)
BLD GP AB SCN SERPL QL: SIGNIFICANT CHANGE UP
EOSINOPHIL # BLD AUTO: 0.1 K/UL — SIGNIFICANT CHANGE UP (ref 0–0.5)
EOSINOPHIL NFR BLD AUTO: 0.7 % — SIGNIFICANT CHANGE UP (ref 0–6)
HCT VFR BLD CALC: 33.5 % — LOW (ref 37–47)
HGB BLD-MCNC: 10.8 G/DL — LOW (ref 12–16)
LYMPHOCYTES # BLD AUTO: 1.9 K/UL — SIGNIFICANT CHANGE UP (ref 1–4.8)
LYMPHOCYTES # BLD AUTO: 15.1 % — LOW (ref 20–55)
MCHC RBC-ENTMCNC: 27.2 PG — SIGNIFICANT CHANGE UP (ref 27–31)
MCHC RBC-ENTMCNC: 32.2 G/DL — SIGNIFICANT CHANGE UP (ref 32–36)
MCV RBC AUTO: 84.4 FL — SIGNIFICANT CHANGE UP (ref 81–99)
MONOCYTES # BLD AUTO: 0.9 K/UL — HIGH (ref 0–0.8)
MONOCYTES NFR BLD AUTO: 7.1 % — SIGNIFICANT CHANGE UP (ref 3–10)
NEUTROPHILS # BLD AUTO: 9.7 K/UL — HIGH (ref 1.8–8)
NEUTROPHILS NFR BLD AUTO: 76.7 % — HIGH (ref 37–73)
PLATELET # BLD AUTO: 180 K/UL — SIGNIFICANT CHANGE UP (ref 150–400)
RBC # BLD: 3.97 M/UL — LOW (ref 4.4–5.2)
RBC # FLD: 14.3 % — SIGNIFICANT CHANGE UP (ref 11–15.6)
TYPE + AB SCN PNL BLD: SIGNIFICANT CHANGE UP
WBC # BLD: 12.6 K/UL — HIGH (ref 4.8–10.8)
WBC # FLD AUTO: 12.6 K/UL — HIGH (ref 4.8–10.8)

## 2019-01-22 PROCEDURE — 59510 CESAREAN DELIVERY: CPT

## 2019-01-22 PROCEDURE — 59514 CESAREAN DELIVERY ONLY: CPT | Mod: 80

## 2019-01-22 RX ORDER — TETANUS TOXOID, REDUCED DIPHTHERIA TOXOID AND ACELLULAR PERTUSSIS VACCINE, ADSORBED 5; 2.5; 8; 8; 2.5 [IU]/.5ML; [IU]/.5ML; UG/.5ML; UG/.5ML; UG/.5ML
0.5 SUSPENSION INTRAMUSCULAR ONCE
Qty: 0 | Refills: 0 | Status: DISCONTINUED | OUTPATIENT
Start: 2019-01-22 | End: 2019-01-25

## 2019-01-22 RX ORDER — DIPHENHYDRAMINE HCL 50 MG
25 CAPSULE ORAL EVERY 6 HOURS
Qty: 0 | Refills: 0 | Status: DISCONTINUED | OUTPATIENT
Start: 2019-01-22 | End: 2019-01-25

## 2019-01-22 RX ORDER — OXYTOCIN 10 UNIT/ML
41.67 VIAL (ML) INJECTION
Qty: 20 | Refills: 0 | Status: DISCONTINUED | OUTPATIENT
Start: 2019-01-22 | End: 2019-01-25

## 2019-01-22 RX ORDER — DIPHENHYDRAMINE HCL 50 MG
12.5 CAPSULE ORAL ONCE
Qty: 0 | Refills: 0 | Status: DISCONTINUED | OUTPATIENT
Start: 2019-01-22 | End: 2019-01-25

## 2019-01-22 RX ORDER — SODIUM CHLORIDE 9 MG/ML
1000 INJECTION, SOLUTION INTRAVENOUS
Qty: 0 | Refills: 0 | Status: DISCONTINUED | OUTPATIENT
Start: 2019-01-22 | End: 2019-01-25

## 2019-01-22 RX ORDER — METOCLOPRAMIDE HCL 10 MG
10 TABLET ORAL ONCE
Qty: 0 | Refills: 0 | Status: DISCONTINUED | OUTPATIENT
Start: 2019-01-22 | End: 2019-01-25

## 2019-01-22 RX ORDER — ONDANSETRON 8 MG/1
4 TABLET, FILM COATED ORAL EVERY 6 HOURS
Qty: 0 | Refills: 0 | Status: DISCONTINUED | OUTPATIENT
Start: 2019-01-22 | End: 2019-01-22

## 2019-01-22 RX ORDER — KETOROLAC TROMETHAMINE 30 MG/ML
30 SYRINGE (ML) INJECTION ONCE
Qty: 0 | Refills: 0 | Status: DISCONTINUED | OUTPATIENT
Start: 2019-01-22 | End: 2019-01-25

## 2019-01-22 RX ORDER — TRANEXAMIC ACID 100 MG/ML
1000 INJECTION, SOLUTION INTRAVENOUS ONCE
Qty: 0 | Refills: 0 | Status: COMPLETED | OUTPATIENT
Start: 2019-01-22 | End: 2019-01-22

## 2019-01-22 RX ORDER — ONDANSETRON 8 MG/1
4 TABLET, FILM COATED ORAL EVERY 6 HOURS
Qty: 0 | Refills: 0 | Status: DISCONTINUED | OUTPATIENT
Start: 2019-01-22 | End: 2019-01-25

## 2019-01-22 RX ORDER — SIMETHICONE 80 MG/1
80 TABLET, CHEWABLE ORAL EVERY 4 HOURS
Qty: 0 | Refills: 0 | Status: DISCONTINUED | OUTPATIENT
Start: 2019-01-22 | End: 2019-01-25

## 2019-01-22 RX ORDER — DOCUSATE SODIUM 100 MG
100 CAPSULE ORAL
Qty: 0 | Refills: 0 | Status: DISCONTINUED | OUTPATIENT
Start: 2019-01-22 | End: 2019-01-25

## 2019-01-22 RX ORDER — CEFAZOLIN SODIUM 1 G
3000 VIAL (EA) INJECTION ONCE
Qty: 0 | Refills: 0 | Status: COMPLETED | OUTPATIENT
Start: 2019-01-22 | End: 2019-01-22

## 2019-01-22 RX ORDER — IBUPROFEN 200 MG
600 TABLET ORAL EVERY 6 HOURS
Qty: 0 | Refills: 0 | Status: DISCONTINUED | OUTPATIENT
Start: 2019-01-22 | End: 2019-01-25

## 2019-01-22 RX ORDER — DIPHENHYDRAMINE HCL 50 MG
12.5 CAPSULE ORAL EVERY 4 HOURS
Qty: 0 | Refills: 0 | Status: DISCONTINUED | OUTPATIENT
Start: 2019-01-22 | End: 2019-01-25

## 2019-01-22 RX ORDER — FERROUS SULFATE 325(65) MG
325 TABLET ORAL DAILY
Qty: 0 | Refills: 0 | Status: DISCONTINUED | OUTPATIENT
Start: 2019-01-22 | End: 2019-01-25

## 2019-01-22 RX ORDER — OXYCODONE AND ACETAMINOPHEN 5; 325 MG/1; MG/1
1 TABLET ORAL
Qty: 0 | Refills: 0 | Status: DISCONTINUED | OUTPATIENT
Start: 2019-01-22 | End: 2019-01-25

## 2019-01-22 RX ORDER — CITRIC ACID/SODIUM CITRATE 300-500 MG
30 SOLUTION, ORAL ORAL ONCE
Qty: 0 | Refills: 0 | Status: COMPLETED | OUTPATIENT
Start: 2019-01-22 | End: 2019-01-22

## 2019-01-22 RX ORDER — NALOXONE HYDROCHLORIDE 4 MG/.1ML
0.1 SPRAY NASAL
Qty: 0 | Refills: 0 | Status: DISCONTINUED | OUTPATIENT
Start: 2019-01-22 | End: 2019-01-25

## 2019-01-22 RX ORDER — OXYTOCIN 10 UNIT/ML
333.33 VIAL (ML) INJECTION
Qty: 20 | Refills: 0 | Status: DISCONTINUED | OUTPATIENT
Start: 2019-01-22 | End: 2019-01-25

## 2019-01-22 RX ORDER — ACETAMINOPHEN 500 MG
1000 TABLET ORAL ONCE
Qty: 0 | Refills: 0 | Status: DISCONTINUED | OUTPATIENT
Start: 2019-01-22 | End: 2019-01-25

## 2019-01-22 RX ORDER — SODIUM CHLORIDE 9 MG/ML
1000 INJECTION, SOLUTION INTRAVENOUS ONCE
Qty: 0 | Refills: 0 | Status: COMPLETED | OUTPATIENT
Start: 2019-01-22 | End: 2019-01-22

## 2019-01-22 RX ORDER — GLYCERIN ADULT
1 SUPPOSITORY, RECTAL RECTAL AT BEDTIME
Qty: 0 | Refills: 0 | Status: DISCONTINUED | OUTPATIENT
Start: 2019-01-22 | End: 2019-01-25

## 2019-01-22 RX ORDER — MORPHINE SULFATE 50 MG/1
0.2 CAPSULE, EXTENDED RELEASE ORAL ONCE
Qty: 0 | Refills: 0 | Status: DISCONTINUED | OUTPATIENT
Start: 2019-01-22 | End: 2019-01-25

## 2019-01-22 RX ORDER — OXYCODONE AND ACETAMINOPHEN 5; 325 MG/1; MG/1
2 TABLET ORAL EVERY 6 HOURS
Qty: 0 | Refills: 0 | Status: DISCONTINUED | OUTPATIENT
Start: 2019-01-22 | End: 2019-01-25

## 2019-01-22 RX ORDER — LANOLIN
1 OINTMENT (GRAM) TOPICAL
Qty: 0 | Refills: 0 | Status: DISCONTINUED | OUTPATIENT
Start: 2019-01-22 | End: 2019-01-25

## 2019-01-22 RX ORDER — HEPARIN SODIUM 5000 [USP'U]/ML
5000 INJECTION INTRAVENOUS; SUBCUTANEOUS EVERY 8 HOURS
Qty: 0 | Refills: 0 | Status: DISCONTINUED | OUTPATIENT
Start: 2019-01-22 | End: 2019-01-25

## 2019-01-22 RX ADMIN — Medication 125 MILLIUNIT(S)/MIN: at 11:07

## 2019-01-22 RX ADMIN — Medication 200 MILLIGRAM(S): at 09:50

## 2019-01-22 RX ADMIN — HEPARIN SODIUM 5000 UNIT(S): 5000 INJECTION INTRAVENOUS; SUBCUTANEOUS at 23:02

## 2019-01-22 RX ADMIN — Medication 30 MILLILITER(S): at 09:04

## 2019-01-22 RX ADMIN — SODIUM CHLORIDE 2000 MILLILITER(S): 9 INJECTION, SOLUTION INTRAVENOUS at 09:00

## 2019-01-22 RX ADMIN — Medication 125 MILLIUNIT(S)/MIN: at 13:00

## 2019-01-22 RX ADMIN — Medication 1000 MILLIUNIT(S)/MIN: at 10:28

## 2019-01-22 RX ADMIN — TRANEXAMIC ACID 220 MILLIGRAM(S): 100 INJECTION, SOLUTION INTRAVENOUS at 09:45

## 2019-01-22 NOTE — BRIEF OPERATIVE NOTE - PROCEDURE
<<-----Click on this checkbox to enter Procedure Application of negative pressure wound therapy system dressing  2019    Active  [id number]  Insertion of closed suction drain with bulb  2019    Active  [id number]  Repeat  section  2019    Active  [id number]

## 2019-01-22 NOTE — BRIEF OPERATIVE NOTE - POST-OP DX
39 weeks gestation of pregnancy  01/22/2019    Active  Steven Linares  Morbid obesity  01/22/2019    Active  Steven Linares

## 2019-01-22 NOTE — OB NEONATOLOGY/PEDIATRICIAN DELIVERY SUMMARY - NSPEDSNEONOTESA_OBGYN_ALL_OB_FT
Requested by Dr Linares to attend a scheduled RC/S of a 34 y/o  mom at 39.2 weeks GA.  She had + PNC, is B pos, HBSAg neg, HIV neg, RPR NR, Rubella Immune, GBS neg.  L&D:  AROM at delivery.  Baby born vertex with good cry, transferred to warmer, orally suctioned, dried, and examined.  baby showed to father and then transferred for STS.  Will transition to Regular Nursery under PMD care.

## 2019-01-22 NOTE — OB RN INTRAOPERATIVE NOTE - NS_COUNTCORRECT_OBGYN_ALL_OB
[facility]    [address]    [facility] [place] [place] [address]    Phone:  [phone]                                       After Visit Summary   10/14/2017    Indra Mccarty    MRN: [id number]           After Visit Summary Signature Page     I have received my discharge instructions, and my questions have been answered. I have discussed any challenges I see with this plan with the nurse or doctor.    ..........................................................................................................................................  Patient/Patient Representative Signature      ..........................................................................................................................................  Patient Representative Print Name and Relationship to Patient    ..................................................               ................................................  Date                                            Time    ..........................................................................................................................................  Reviewed by Signature/Title    ...................................................              ..............................................  Date                                                            Time           Yes

## 2019-01-22 NOTE — BRIEF OPERATIVE NOTE - OPERATION/FINDINGS
large edematous pannus, vigorous FEMALE baby, vertex, apgars 9/9, weight deferred to skin to skin. Normal uterus tubes and ovaries.

## 2019-01-22 NOTE — OB PROVIDER H&P - ASSESSMENT
34yo  at 39w2d by LMP 18 admitted for repeat CS.  Cat 1 tracing - reassuring maternal-fetal status.    Admit to OBGYN  Routine Labs  Continuous FHM + Elmore  Ancef 3g  Tranexamic Acid 1g for PPH risk  Repeat CS    D/W Dr. Linares

## 2019-01-22 NOTE — OB PROVIDER H&P - HISTORY OF PRESENT ILLNESS
34yo  at 39w2d by LMP 18 presents for scheduled repeat  section. Denies VB, CTX, or LOF. +FM.  No known complications with this pregnancy.

## 2019-01-22 NOTE — OB PROVIDER H&P - NSHPPHYSICALEXAM_GEN_ALL_CORE
T(C): 36.8 (01-22-19 @ 08:16), Max: 36.8 (01-22-19 @ 08:16)  T(F): 98.2 (01-22-19 @ 08:16), Max: 98.2 (01-22-19 @ 08:16)  HR: 85 (01-22-19 @ 08:30) (85 - 85)  BP: 137/83 (01-22-19 @ 08:30) (137/83 - 137/83)  RR: 16 (01-22-19 @ 08:16) (16 - 16)    Gen: NAD, AOx3  Abd: soft, nontender, obese    Bedside sono: vertex  Placenta: anterior    FHT: Cat 1  [place]: quiet

## 2019-01-23 LAB — T PALLIDUM AB TITR SER: NEGATIVE — SIGNIFICANT CHANGE UP

## 2019-01-23 RX ADMIN — SIMETHICONE 80 MILLIGRAM(S): 80 TABLET, CHEWABLE ORAL at 13:07

## 2019-01-23 RX ADMIN — HEPARIN SODIUM 5000 UNIT(S): 5000 INJECTION INTRAVENOUS; SUBCUTANEOUS at 15:08

## 2019-01-23 RX ADMIN — Medication 600 MILLIGRAM(S): at 19:34

## 2019-01-23 RX ADMIN — SIMETHICONE 80 MILLIGRAM(S): 80 TABLET, CHEWABLE ORAL at 19:42

## 2019-01-23 RX ADMIN — Medication 600 MILLIGRAM(S): at 13:07

## 2019-01-23 RX ADMIN — Medication 600 MILLIGRAM(S): at 18:34

## 2019-01-23 RX ADMIN — Medication 325 MILLIGRAM(S): at 13:09

## 2019-01-23 RX ADMIN — Medication 1 TABLET(S): at 13:08

## 2019-01-23 RX ADMIN — Medication 100 MILLIGRAM(S): at 13:07

## 2019-01-23 RX ADMIN — HEPARIN SODIUM 5000 UNIT(S): 5000 INJECTION INTRAVENOUS; SUBCUTANEOUS at 22:09

## 2019-01-23 RX ADMIN — Medication 600 MILLIGRAM(S): at 14:07

## 2019-01-23 RX ADMIN — HEPARIN SODIUM 5000 UNIT(S): 5000 INJECTION INTRAVENOUS; SUBCUTANEOUS at 06:36

## 2019-01-23 NOTE — PROGRESS NOTE ADULT - SUBJECTIVE AND OBJECTIVE BOX
Postoperative day #1 status post repeat  section of a FEMALE infant.  B POSITIVE, Rubella Immune    S:  The patient has no complaints.      O:      Vitals  T(C): 37.2 (19 @ 04:17), Max: 37.2 (19 @ 04:17)  HR: 78 (19 @ 04:17) (68 - 85)  BP: 110/66 (19 @ 04:17) (110/66 - 143/58)  RR: 18 (19 @ 04:17) (15 - 18)  SpO2: 96% (19 @ 23:49) (96% - 97%)      Labs  Complete Blood Count + Automated Diff (19 @ 18:03)    WBC Count: 12.6 K/uL    Hemoglobin: 10.8 g/dL    Hematocrit: 33.5 %    Platelet Count - Automated: 180 K/uL    Complete Blood Count + Automated Diff (01.10.19 @ 16:29)    WBC Count: 11.5 K/uL    Hemoglobin: 11.6 g/dL    Hematocrit: 34.7 %    Platelet Count - Automated: 210 K/uL      Physical Exam  Chest:  Clear to auscultation bilaterally.  No rales, rhonchi or wheezing.  Cardiovascular:  +s1, s2.  Regular rate and rhythm.  Abdomen:  Soft, obese, non-tender, non-distended with bowel sounds present.  Uterus:  Fundus firm below umbilicus.  Incision:  Clean, dry, intact with BETO dressing.  RENATO drain in place.  Vaginal exam:  Lochia.  Extremities:  Non-tender.      A/P:  POD#1  -Stable.  Advance care.  -Continue routine postpartum/postoperative care and education.

## 2019-01-23 NOTE — PROGRESS NOTE ADULT - SUBJECTIVE AND OBJECTIVE BOX
Name: TEETEE AUGUSTIN  MRN: [id number]  Date Admitted: 19  Location: [facility] ([facility])  Attending: Steven Linares    All: No Known Allergies    Post Partum Note:     TEETEE AUGUSTIN is a 33y  s/p uncomplicated repeat CS POD #1 due to previous CS.    SUBJECTIVE:  No acute events overnight. Pain is well controlled with PRN pain medication. No problems with ambulating, voiding, or PO intake. Has had flatus but no BM. Denies N/V. Patient is having minimal lochia which is decreasing.    She is breastfeeding and the baby is latching on.     OBJECTIVE:  Physical exam:  General: AOx3, NAD.  Heart: RRR. S1S2.  Lungs: CTABL. Good airflow bilaterally.   Abdomen: +BS, Soft, appropriately tender, nondistended, no guarding or rebound tenderness, firm uterine fundus at umbilicus, BETO dressing in place with good vacuum, no saturation, RENATO drain in place with ~1cc serosanguinous fluid.  Ext: No DVT signs, warm extremities.    Vital Signs Last 24 Hrs  T(C): 37.2 (2019 04:17), Max: 37.2 (2019 04:17)  T(F): 98.9 (2019 04:17), Max: 98.9 (2019 04:17)  HR: 78 (2019 04:17) (57 - 85)  BP: 110/66 (2019 04:17) (109/78 - 143/58)  RR: 18 (2019 04:17) (12 - 22)  SpO2: 96% (2019 23:49) (96% - 98%)    LABS:                        10.8   12.6  )-----------( 180      ( 2019 18:03 )             33.5

## 2019-01-24 ENCOUNTER — TRANSCRIPTION ENCOUNTER (OUTPATIENT)
Age: 34
End: 2019-01-24

## 2019-01-24 RX ORDER — IBUPROFEN 200 MG
1 TABLET ORAL
Qty: 30 | Refills: 0 | OUTPATIENT
Start: 2019-01-24

## 2019-01-24 RX ADMIN — HEPARIN SODIUM 5000 UNIT(S): 5000 INJECTION INTRAVENOUS; SUBCUTANEOUS at 23:03

## 2019-01-24 RX ADMIN — Medication 600 MILLIGRAM(S): at 01:25

## 2019-01-24 RX ADMIN — Medication 1 TABLET(S): at 13:03

## 2019-01-24 RX ADMIN — Medication 325 MILLIGRAM(S): at 13:05

## 2019-01-24 RX ADMIN — Medication 600 MILLIGRAM(S): at 20:27

## 2019-01-24 RX ADMIN — Medication 100 MILLIGRAM(S): at 00:29

## 2019-01-24 RX ADMIN — Medication 600 MILLIGRAM(S): at 07:00

## 2019-01-24 RX ADMIN — Medication 600 MILLIGRAM(S): at 21:15

## 2019-01-24 RX ADMIN — Medication 600 MILLIGRAM(S): at 00:30

## 2019-01-24 RX ADMIN — Medication 100 MILLIGRAM(S): at 13:04

## 2019-01-24 RX ADMIN — SIMETHICONE 80 MILLIGRAM(S): 80 TABLET, CHEWABLE ORAL at 23:07

## 2019-01-24 RX ADMIN — Medication 600 MILLIGRAM(S): at 06:20

## 2019-01-24 RX ADMIN — HEPARIN SODIUM 5000 UNIT(S): 5000 INJECTION INTRAVENOUS; SUBCUTANEOUS at 06:19

## 2019-01-24 RX ADMIN — Medication 600 MILLIGRAM(S): at 13:04

## 2019-01-24 RX ADMIN — SIMETHICONE 80 MILLIGRAM(S): 80 TABLET, CHEWABLE ORAL at 00:30

## 2019-01-24 RX ADMIN — HEPARIN SODIUM 5000 UNIT(S): 5000 INJECTION INTRAVENOUS; SUBCUTANEOUS at 15:00

## 2019-01-24 NOTE — DISCHARGE NOTE OB - PLAN OF CARE
rapid recovery Assessment and Plan of Treatment: Please call your provider to schedule postoperative wound check visit in 3-4 days. Take medications as directed, regular diet, activity as tolerated. Exclusive breast feeding for the first 6 months is recommended. Nothing per vagina for 6 weeks (incl. sex, douching, etc). If you have additional concerns, please inform your provider. Assessment and Plan of Treatment: Please call your provider to schedule postoperative wound check visit in 1 week. Take medications as directed, regular diet, activity as tolerated. Exclusive breast feeding for the first 6 months is recommended. Nothing per vagina for 6 weeks (incl. sex, douching, etc). If you have additional concerns, please inform your provider.

## 2019-01-24 NOTE — DISCHARGE NOTE OB - HOSPITAL COURSE
32 yo  delivered at term via repeat  section. She was transferred to postpartum unit without complications during her stay. Upon discharge she is voiding, tolerating PO, ambulating, and pain is controlled. 34 y/o  delivered at term via elective repeat  section. She was transferred to the postpartum unit without complications during her stay. Upon discharge, she is voiding, tolerating PO, ambulating, and pain is controlled. She has a BETO dressing and RENATO drain in place. She will follow up in the [facility] office for incision check in 1 week.

## 2019-01-24 NOTE — DISCHARGE NOTE OB - CARE PLAN
Principal Discharge DX:	 delivery delivered  Goal:	rapid recovery  Assessment and plan of treatment:	Assessment and Plan of Treatment: Please call your provider to schedule postoperative wound check visit in 3-4 days. Take medications as directed, regular diet, activity as tolerated. Exclusive breast feeding for the first 6 months is recommended. Nothing per vagina for 6 weeks (incl. sex, douching, etc). If you have additional concerns, please inform your provider. Principal Discharge DX:	 delivery delivered  Goal:	rapid recovery  Assessment and plan of treatment:	Assessment and Plan of Treatment: Please call your provider to schedule postoperative wound check visit in 1 week. Take medications as directed, regular diet, activity as tolerated. Exclusive breast feeding for the first 6 months is recommended. Nothing per vagina for 6 weeks (incl. sex, douching, etc). If you have additional concerns, please inform your provider.

## 2019-01-24 NOTE — DISCHARGE NOTE OB - CARE PROVIDER_API CALL
Ana Lilia Baltazar), Obstetrics and Gynecology  [address] [place] [address]  Phone: [phone]  Fax: [phone] Steven Linares (MD), Obstetrics and Gynecology  [address] [place] [address]  Phone: [phone]  Fax: [phone]

## 2019-01-24 NOTE — DISCHARGE NOTE OB - PATIENT PORTAL LINK FT
You can access the [facility] Patient Portal, offered by [facility], by registering with the following website: [email or web address]

## 2019-01-24 NOTE — PROGRESS NOTE ADULT - SUBJECTIVE AND OBJECTIVE BOX
Name: TEETEE AUGUSTIN  MRN: [id number]  Date Admitted: 19  Location: [facility] ([facility] 2E)  Attending: Steven Linares    All: No Known Allergies    Post Partum Note:     TEETEE AUGUSTIN is a 33y  s/p uncomplicated repeat CS POD #2 due to previous CS.    SUBJECTIVE:  No acute events overnight. Pain is well controlled with PRN pain medication. No problems with ambulating, voiding, or PO intake. Has had flatus but no BM. Denies N/V. Patient is having minimal lochia which is decreasing.    She is breastfeeding and the baby is latching on.     OBJECTIVE:  Physical exam:  General: AOx3, NAD.  Abdomen: Soft, appropriately tender, nondistended, no guarding or rebound tenderness, firm uterine fundus at umbilicus, BETO dressing in place with good vacuum, no saturation, RENATO drain in place with ~0.5cc serosanguinous fluid.  Ext: No DVT signs, warm extremities.    Vital Signs Last 24 Hrs  T(C): 36.5 (2019 20:51), Max: 36.9 (2019 08:00)  T(F): 97.7 (2019 20:51), Max: 98.5 (2019 08:00)  HR: 91 (2019 20:51) (78 - 91)  BP: 111/70 (2019 20:51) (104/65 - 111/70)  RR: 18 (2019 20:51) (18 - 18)    LABS:                        10.8   12.6  )-----------( 180      ( 2019 18:03 )             33.5     I&O's Detail    2019 07:01  -  2019 07:00  --------------------------------------------------------  IN:    Lactated Ringers IV Bolus: 2000 mL    oxytocin Infusion: 1000 mL  Total IN: 3000 mL    OUT:    Bulb: 15 mL    Estimated Blood Loss: 600 mL    Indwelling Catheter - Urethral: 600 mL    Voided: 450 mL  Total OUT: 1665 mL    Total NET: 1335 mL      2019 07:01  -  2019 06:15  --------------------------------------------------------  IN:  Total IN: 0 mL    OUT:    Bulb: 11 mL  Total OUT: 11 mL    Total NET: -11 mL

## 2019-01-24 NOTE — DISCHARGE NOTE OB - MEDICATION SUMMARY - MEDICATIONS TO TAKE
I will START or STAY ON the medications listed below when I get home from the hospital:    Percocet 5/325 oral tablet  -- 1 tab(s) by mouth every 6 hours, As Needed -Moderate Pain (4 - 6) MDD:4   -- Indication: For severe pain    ibuprofen 600 mg oral tablet  -- 1 tab(s) by mouth every 6 hours, As needed, Mild Pain (1 - 3)  -- Indication: For Moderate pain

## 2019-01-25 VITALS — SYSTOLIC BLOOD PRESSURE: 123 MMHG | HEART RATE: 99 BPM | DIASTOLIC BLOOD PRESSURE: 77 MMHG

## 2019-01-25 PROCEDURE — 36415 COLL VENOUS BLD VENIPUNCTURE: CPT

## 2019-01-25 PROCEDURE — 86850 RBC ANTIBODY SCREEN: CPT

## 2019-01-25 PROCEDURE — 59025 FETAL NON-STRESS TEST: CPT

## 2019-01-25 PROCEDURE — 86900 BLOOD TYPING SEROLOGIC ABO: CPT

## 2019-01-25 PROCEDURE — 85027 COMPLETE CBC AUTOMATED: CPT

## 2019-01-25 PROCEDURE — G0463: CPT

## 2019-01-25 PROCEDURE — 86901 BLOOD TYPING SEROLOGIC RH(D): CPT

## 2019-01-25 PROCEDURE — 86780 TREPONEMA PALLIDUM: CPT

## 2019-01-25 PROCEDURE — 59050 FETAL MONITOR W/REPORT: CPT

## 2019-01-25 RX ADMIN — Medication 100 MILLIGRAM(S): at 12:53

## 2019-01-25 RX ADMIN — Medication 1 TABLET(S): at 12:50

## 2019-01-25 RX ADMIN — Medication 600 MILLIGRAM(S): at 06:45

## 2019-01-25 RX ADMIN — Medication 600 MILLIGRAM(S): at 12:51

## 2019-01-25 RX ADMIN — HEPARIN SODIUM 5000 UNIT(S): 5000 INJECTION INTRAVENOUS; SUBCUTANEOUS at 06:01

## 2019-01-25 RX ADMIN — Medication 600 MILLIGRAM(S): at 06:00

## 2019-01-25 RX ADMIN — SIMETHICONE 80 MILLIGRAM(S): 80 TABLET, CHEWABLE ORAL at 12:53

## 2019-01-25 RX ADMIN — Medication 600 MILLIGRAM(S): at 13:35

## 2019-01-25 RX ADMIN — Medication 325 MILLIGRAM(S): at 12:50

## 2019-01-25 NOTE — PROGRESS NOTE ADULT - ATTENDING COMMENTS
Patient seen   feeling well   VSS   RENATO drain in place    Plan:  anticipate d/c home tomorrow  d/c RENATO  keep dung dressing
Agree with above. Patient is doing well and desires to go home. RENATO drain removed. BETO dressing changed. Incision- c/d/i with staples. Discharge home today.

## 2019-01-25 NOTE — PROGRESS NOTE ADULT - SUBJECTIVE AND OBJECTIVE BOX
Name: TEETEE AUGUSTIN  MRN: [id number]  Date Admitted: 19  Location: [facility] ([facility])  Attending: Steven Linares    All: No Known Allergies    Post Partum Note:       TEETEE AUGUSTIN is a 33y  s/p uncomplicated repeat CS POD #3 due to previous CS.    SUBJECTIVE:  No acute events overnight. Pain is well controlled with PRN pain medication. No problems with ambulating, voiding, or PO intake. Has had flatus and BM. Denies N/V. Patient is having minimal lochia which is decreasing.    She is breastfeeding and the baby is latching on.     OBJECTIVE:  Physical exam:  General: AOx3, NAD.  Heart: RRR. S1S2.  Lungs: CTABL. Good airflow bilaterally.   Abdomen: +BS, Soft, appropriately tender, nondistended, no guarding or rebound tenderness, firm uterine fundus at umbilicus, BETO dressing in place with good vacuum, no saturation, RENATO drain in place with <0.5cc serosanguinous fluid.  Ext: No DVT signs, warm extremities.    Vital Signs Last 24 Hrs  T(C): 37.1 (2019 19:45), Max: 37.1 (2019 19:45)  T(F): 98.8 (2019 19:45), Max: 98.8 (2019 19:45)  HR: 94 (2019 19:45) (76 - 94)  BP: 130/82 (2019 19:45) (104/66 - 130/82)  RR: 20 (2019 19:45) (18 - 20)    LABS:  Complete Blood Count + Automated Diff (19 @ 18:03)    WBC Count: 12.6 K/uL    RBC Count: 3.97 M/uL    Hemoglobin: 10.8 g/dL    Hematocrit: 33.5 %    Mean Cell Volume: 84.4 fl    Mean Cell Hemoglobin: 27.2 pg    Mean Cell Hemoglobin Conc: 32.2 g/dL    Red Cell Distrib Width: 14.3 %    Platelet Count - Automated: 180 K/uL    Auto Neutrophil #: 9.7 K/uL    Auto Lymphocyte #: 1.9 K/uL    Auto Monocyte #: 0.9 K/uL    Auto Eosinophil #: 0.1 K/uL    Auto Basophil #: 0.0 K/uL    Auto Neutrophil %: 76.7: Differential percentages must be correlated with absolute numbers for  clinical significance. %    Auto Lymphocyte %: 15.1 %    Auto Monocyte %: 7.1 %    Auto Eosinophil %: 0.7 %    Auto Basophil %: 0.2 %    I&O's Detail    2019 07:01  -  2019 07:00  --------------------------------------------------------  IN:  Total IN: 0 mL    OUT:    Bulb: 11 mL  Total OUT: 11 mL    Total NET: -11 mL Name: TEETEE AUGUSTIN  MRN: [id number]  Date Admitted: 19  Location: [facility] 2000 ([facility] 2E)  Attending: Steven Linares    All: No Known Allergies    Post Partum Note:     TEETEE AUGUSTIN is a 33y  s/p uncomplicated repeat CS POD #3 due to previous CS.    SUBJECTIVE:  No acute events overnight. Pain is well controlled with PRN pain medication. No problems with ambulating, voiding, or PO intake. Has had flatus and BM. Denies N/V. Patient is having minimal lochia which is decreasing.    She is breastfeeding and the baby is latching on.     OBJECTIVE:  Physical exam:  General: AOx3, NAD.  Heart: RRR. S1S2.  Lungs: CTABL. Good airflow bilaterally.   Abdomen: +BS, Soft, appropriately tender, nondistended, no guarding or rebound tenderness, firm uterine fundus at umbilicus, BETO dressing in place with good vacuum, no saturation, RENATO drain in place with <0.5cc serosanguinous fluid.  Ext: No DVT signs, warm extremities.    Vital Signs Last 24 Hrs  T(C): 37.1 (2019 19:45), Max: 37.1 (2019 19:45)  T(F): 98.8 (2019 19:45), Max: 98.8 (2019 19:45)  HR: 94 (2019 19:45) (76 - 94)  BP: 130/82 (2019 19:45) (104/66 - 130/82)  RR: 20 (2019 19:45) (18 - 20)    LABS:  Complete Blood Count + Automated Diff (19 @ 18:03)    WBC Count: 12.6 K/uL    RBC Count: 3.97 M/uL    Hemoglobin: 10.8 g/dL    Hematocrit: 33.5 %    Mean Cell Volume: 84.4 fl    Mean Cell Hemoglobin: 27.2 pg    Mean Cell Hemoglobin Conc: 32.2 g/dL    Red Cell Distrib Width: 14.3 %    Platelet Count - Automated: 180 K/uL    Auto Neutrophil #: 9.7 K/uL    Auto Lymphocyte #: 1.9 K/uL    Auto Monocyte #: 0.9 K/uL    Auto Eosinophil #: 0.1 K/uL    Auto Basophil #: 0.0 K/uL    Auto Neutrophil %: 76.7: Differential percentages must be correlated with absolute numbers for  clinical significance. %    Auto Lymphocyte %: 15.1 %    Auto Monocyte %: 7.1 %    Auto Eosinophil %: 0.7 %    Auto Basophil %: 0.2 %    I&O's Detail    2019 07:01  -  2019 07:00  --------------------------------------------------------  IN:  Total IN: 0 mL    OUT:    Bulb: 11 mL  Total OUT: 11 mL    Total NET: -11 mL

## 2019-01-25 NOTE — PROGRESS NOTE ADULT - ASSESSMENT
33y  s/p uncomplicated repeat CS POD #2 due to previous CS.  Post-op CBC reviewed and WNL. VSS.  Pt meeting appropriate milestones.
33y  s/p uncomplicated repeat CS POD #3 due to previous CS.  Post-op CBC reviewed and WNL. VSS.  Pt meeting appropriate milestones.  Desires to go home today.
A/P: POD#3 s/p rC/S. Patient doing well.    [ ] BETO dressing was changed and RENATO drain was removed.  [ ] Pain control PRN.  [ ] Encourage ambulation and incentive spirometry use.  [ ] Routine post operative and postpartum care.  [ ] SCDs for DVT prophylaxis.   [ ] For discharge home today
33y  s/p uncomplicated repeat CS POD #1 due to previous CS.  Post-op CBC reviewed and WNL. VSS.  Pt meeting appropriate milestones.

## 2019-01-25 NOTE — PROGRESS NOTE ADULT - SUBJECTIVE AND OBJECTIVE BOX
S: Patient is doing well with no complaints. +Mild to moderate abdominal pain controlled with pain meds. +Tolerating diet without any nausea or vomiting. +Voiding without any problems. +Flatus. +Bowel movement. +Ambulating without any problems. Mild lochia. Patient desires to go home.     O:  Vital Signs Last 24 Hrs  T(C): 36.8 (25 Jan 2019 07:59), Max: 37.1 (24 Jan 2019 19:45)  T(F): 98.3 (25 Jan 2019 07:59), Max: 98.8 (24 Jan 2019 19:45)  HR: 79 (25 Jan 2019 07:59) (76 - 94)  BP: 130/75 (25 Jan 2019 07:59) (104/66 - 130/82)  BP(mean): --  RR: 18 (25 Jan 2019 07:59) (18 - 20)  SpO2: --  Gen: NAD. A&Ox3.  CV: RRR  Lungs: CTAB. No respiratory distress.  Abd: Soft, non-distended, appropriately tender. RENATO dressing removed without any complications and site re-approximated with steri-strip. BETO dressing was removed and changed. Pfannenstiel incision closed with staples- clean/dry/intact with no erythema, drainage or bleeding. +Bowel sounds.  Ext: No calf tenderness. Bilateral lower extremity edema 1+.

## 2019-01-25 NOTE — PROGRESS NOTE ADULT - PROBLEM SELECTOR PLAN 1
1. Routine post-partum care.  2. Continue Heparin. Encourage ambulation - if pt is not ambulating please use SCDs for DVT ppx.  3. Regular diet.  4. Encourage mother-baby interaction.  5. Plan for discharge on post-partum day 3 or 4.
1. Routine post-partum care.  2. Continue Heparin. Encourage ambulation - if pt is not ambulating please use SCDs for DVT ppx.  3. Regular diet.  4. Encourage mother-baby interaction.  5. Plan for discharge on post-partum day 3 or 4.
1. Routine post-partum care.  2. Continue Heparin. Encourage ambulation - if pt is not ambulating please use SCDs for DVT ppx.  3. Regular diet.  4. Encourage mother-baby interaction.  5. Plan for discharge today pending Attending's approval

## 2019-01-28 PROBLEM — F41.9 ANXIETY DISORDER, UNSPECIFIED: Chronic | Status: ACTIVE | Noted: 2018-03-08

## 2019-01-28 PROBLEM — O02.1 MISSED ABORTION: Chronic | Status: ACTIVE | Noted: 2017-10-11

## 2019-01-30 ENCOUNTER — APPOINTMENT (OUTPATIENT)
Dept: OBGYN | Facility: CLINIC | Age: 34
End: 2019-01-30

## 2019-01-30 VITALS
DIASTOLIC BLOOD PRESSURE: 80 MMHG | BODY MASS INDEX: 45.99 KG/M2 | HEIGHT: 67 IN | WEIGHT: 293 LBS | SYSTOLIC BLOOD PRESSURE: 126 MMHG

## 2019-02-04 ENCOUNTER — OUTPATIENT (OUTPATIENT)
Dept: OUTPATIENT SERVICES | Facility: HOSPITAL | Age: 34
LOS: 1 days | End: 2019-02-04
Payer: COMMERCIAL

## 2019-02-04 ENCOUNTER — OTHER (OUTPATIENT)
Age: 34
End: 2019-02-04

## 2019-02-04 ENCOUNTER — APPOINTMENT (OUTPATIENT)
Dept: OBGYN | Facility: CLINIC | Age: 34
End: 2019-02-04
Payer: COMMERCIAL

## 2019-02-04 VITALS
HEIGHT: 67 IN | SYSTOLIC BLOOD PRESSURE: 138 MMHG | WEIGHT: 293 LBS | BODY MASS INDEX: 45.99 KG/M2 | DIASTOLIC BLOOD PRESSURE: 92 MMHG

## 2019-02-04 VITALS — TEMPERATURE: 99 F

## 2019-02-04 VITALS — DIASTOLIC BLOOD PRESSURE: 90 MMHG | HEART RATE: 72 BPM | SYSTOLIC BLOOD PRESSURE: 136 MMHG

## 2019-02-04 LAB
APTT BLD: 21.6 SEC — LOW (ref 27.5–36.3)
BILIRUB UR QL STRIP: NORMAL
CLARITY UR: CLEAR
COLLECTION METHOD: NORMAL
GLUCOSE UR-MCNC: NORMAL
HCG UR QL: 0.2 EU/DL
HCG UR QL: NEGATIVE
HGB UR QL STRIP.AUTO: ABNORMAL
INR BLD: 0.99 RATIO — SIGNIFICANT CHANGE UP (ref 0.88–1.16)
KETONES UR-MCNC: NORMAL
LEUKOCYTE ESTERASE UR QL STRIP: NORMAL
NITRITE UR QL STRIP: NORMAL
PH UR STRIP: 7
PROT UR STRIP-MCNC: NORMAL
PROTHROM AB SERPL-ACNC: 11.4 SEC — SIGNIFICANT CHANGE UP (ref 10–12.9)
QUALITY CONTROL: YES
SP GR UR STRIP: 1.01

## 2019-02-04 PROCEDURE — 59025 FETAL NON-STRESS TEST: CPT

## 2019-02-04 PROCEDURE — 84156 ASSAY OF PROTEIN URINE: CPT

## 2019-02-04 PROCEDURE — 85610 PROTHROMBIN TIME: CPT

## 2019-02-04 PROCEDURE — 99213 OFFICE O/P EST LOW 20 MIN: CPT | Mod: 24

## 2019-02-04 PROCEDURE — 85730 THROMBOPLASTIN TIME PARTIAL: CPT

## 2019-02-04 PROCEDURE — G0463: CPT

## 2019-02-04 PROCEDURE — 36415 COLL VENOUS BLD VENIPUNCTURE: CPT

## 2019-02-04 PROCEDURE — 81003 URINALYSIS AUTO W/O SCOPE: CPT | Mod: QW

## 2019-02-04 PROCEDURE — 84550 ASSAY OF BLOOD/URIC ACID: CPT

## 2019-02-04 PROCEDURE — 80053 COMPREHEN METABOLIC PANEL: CPT

## 2019-02-04 PROCEDURE — 81025 URINE PREGNANCY TEST: CPT

## 2019-02-04 PROCEDURE — 82570 ASSAY OF URINE CREATININE: CPT

## 2019-02-04 PROCEDURE — 83615 LACTATE (LD) (LDH) ENZYME: CPT

## 2019-02-04 PROCEDURE — 85027 COMPLETE CBC AUTOMATED: CPT

## 2019-02-04 RX ORDER — SODIUM CHLORIDE 9 MG/ML
3 INJECTION INTRAMUSCULAR; INTRAVENOUS; SUBCUTANEOUS EVERY 8 HOURS
Qty: 0 | Refills: 0 | Status: DISCONTINUED | OUTPATIENT
Start: 2019-02-04 | End: 2019-02-19

## 2019-02-04 RX ORDER — LABETALOL HCL 100 MG
100 TABLET ORAL
Qty: 0 | Refills: 0 | Status: DISCONTINUED | OUTPATIENT
Start: 2019-02-04 | End: 2019-02-19

## 2019-02-04 RX ORDER — LABETALOL HCL 100 MG
1 TABLET ORAL
Qty: 30 | Refills: 0 | OUTPATIENT
Start: 2019-02-04

## 2019-02-04 RX ADMIN — SODIUM CHLORIDE 3 MILLILITER(S): 9 INJECTION INTRAMUSCULAR; INTRAVENOUS; SUBCUTANEOUS at 14:45

## 2019-02-04 RX ADMIN — Medication 100 MILLIGRAM(S): at 16:21

## 2019-02-04 NOTE — OB PROVIDER TRIAGE NOTE - NSOBPROVIDERNOTE_OBGYN_ALL_OB_FT
32 y/o female s/p C/S on 1/22/19, presents for elevated postpartum blood pressures. No pre-eclamptic symptoms.     -PIH labs ordered and pending.  -Monitor BPs.  -Labetalol 100 mg PO ordered due to elevated BPs.  -Will continue to monitor. 34 y/o female s/p C/S on 1/22/19, presents for elevated postpartum blood pressures. Patient has no pre-eclamptic symptoms.     -PIH labs: WNL except Uric Acid 7.5 and Urine Pr/Cr ratio: 0.4 (likely elevated due to postpartum lochia).  -BPs monitored x 3 hours: No severe range BPs. BP ranges 120s-140s/50s-90s (1 outlier with ).   -Labetalol 100 mg PO x 1 given and patient tolerated well with no side effects. BPs after Labetalol administration: 117-136/57-74.   -Rx Labetalol 100 mg every 12 hours e-prescribed. Discussed holding parameters of SBP < 120 or HR < 60.   -Will obtain outpatient consultation with nephrology (Dr. Smalls) for postpartum BP management.  -She agrees to maintain home BP diary with BP measurements twice daily. Discussed with patient that she will need to call the office immediately if SBP > 159, DBP > 109, or pre-eclamptic symptoms (headaches, blurry vision, chest pain, shortness of breath, or RUQ/epigastric pain).   -Follow up in the [facility] office in 4 days for BP check. Patient to call [facility] office to obtain an appointment.

## 2019-02-04 NOTE — PHYSICAL EXAM
[Awake] : awake [Alert] : alert [Oriented x3] : oriented to person, place, and time [___ +] : [unfilled]U+ pitting edema to the right ankle

## 2019-02-04 NOTE — DISCUSSION/SUMMARY
[FreeTextEntry1] : discussed pt with on call physician Dr. Paz.  Concern for pre -eclampsia. Pt was referred to L&D for further evaluation.

## 2019-02-04 NOTE — OB PROVIDER TRIAGE NOTE - HISTORY OF PRESENT ILLNESS
32 y/o female s/p C/S on 1/22/18, presents for elevated blood pressures and r/o postpartum pre-eclampsia. She was evaluated at the [facility] office today with /92 at 12:17. She reported previously having a headache, which started yesterday afternoon, however resolved this morning. Denies any current headaches. Denies having any blurry vision, visual changes, chest pain, shortness of breath, or RUQ/epigastric pain.

## 2019-02-04 NOTE — HISTORY OF PRESENT ILLNESS
[Last Pap ___] : Last cervical pap smear was [unfilled] [Reproductive Age] : is of reproductive age [Definite:  ___ (Date)] : the last menstrual period was [unfilled] [Sexually Active] : is sexually active [Monogamous] : is monogamous [Male ___] : [unfilled] male

## 2019-02-04 NOTE — OB PROVIDER TRIAGE NOTE - NSHPPHYSICALEXAM_GEN_ALL_CORE
Gen: NAD. A&Ox3.  CV: RRR  Lungs: CTAB. No respiratory distress.  Abd: Soft, non-distended. Non-tender. No RUQ/epigastric tenderness. Pfannenstiel incision- c/d/i.  Ext: Bilateral lower extremity edema 1+. No calf tenderness.

## 2019-02-08 ENCOUNTER — APPOINTMENT (OUTPATIENT)
Dept: OBGYN | Facility: CLINIC | Age: 34
End: 2019-02-08
Payer: COMMERCIAL

## 2019-02-08 VITALS — DIASTOLIC BLOOD PRESSURE: 92 MMHG | SYSTOLIC BLOOD PRESSURE: 142 MMHG

## 2019-02-08 VITALS
SYSTOLIC BLOOD PRESSURE: 150 MMHG | DIASTOLIC BLOOD PRESSURE: 98 MMHG | HEIGHT: 67 IN | BODY MASS INDEX: 45.99 KG/M2 | WEIGHT: 293 LBS

## 2019-02-08 PROCEDURE — 36415 COLL VENOUS BLD VENIPUNCTURE: CPT

## 2019-02-08 PROCEDURE — 99213 OFFICE O/P EST LOW 20 MIN: CPT | Mod: 24

## 2019-02-11 LAB
ALBUMIN SERPL ELPH-MCNC: 3.8 G/DL
ALP BLD-CCNC: 77 U/L
ALT SERPL-CCNC: 16 U/L
ANION GAP SERPL CALC-SCNC: 15 MMOL/L
AST SERPL-CCNC: 15 U/L
BASOPHILS # BLD AUTO: 0.03 K/UL
BASOPHILS NFR BLD AUTO: 0.4 %
BILIRUB SERPL-MCNC: 0.3 MG/DL
BUN SERPL-MCNC: 14 MG/DL
CALCIUM SERPL-MCNC: 10.1 MG/DL
CHLORIDE SERPL-SCNC: 102 MMOL/L
CO2 SERPL-SCNC: 26 MMOL/L
CREAT SERPL-MCNC: 0.75 MG/DL
EOSINOPHIL # BLD AUTO: 0.4 K/UL
EOSINOPHIL NFR BLD AUTO: 5.5 %
GLUCOSE SERPL-MCNC: 67 MG/DL
HCT VFR BLD CALC: 40.8 %
HGB BLD-MCNC: 12.4 G/DL
IMM GRANULOCYTES NFR BLD AUTO: 0.1 %
LDH SERPL-CCNC: 247 U/L
LYMPHOCYTES # BLD AUTO: 2.33 K/UL
LYMPHOCYTES NFR BLD AUTO: 31.8 %
MAN DIFF?: NORMAL
MCHC RBC-ENTMCNC: 26.2 PG
MCHC RBC-ENTMCNC: 30.4 GM/DL
MCV RBC AUTO: 86.3 FL
MONOCYTES # BLD AUTO: 0.51 K/UL
MONOCYTES NFR BLD AUTO: 7 %
NEUTROPHILS # BLD AUTO: 4.04 K/UL
NEUTROPHILS NFR BLD AUTO: 55.2 %
PLATELET # BLD AUTO: 286 K/UL
POTASSIUM SERPL-SCNC: 4.4 MMOL/L
PROT SERPL-MCNC: 6.9 G/DL
RBC # BLD: 4.73 M/UL
RBC # FLD: 13.6 %
SODIUM SERPL-SCNC: 143 MMOL/L
URATE SERPL-MCNC: 7 MG/DL
WBC # FLD AUTO: 7.32 K/UL

## 2019-02-11 NOTE — HISTORY OF PRESENT ILLNESS
[Postpartum Follow Up] : postpartum follow up [Delivery Date: ___] : on [unfilled] [Repeat C/S] : delivered by  section (repeat) [Female] : Delivery History: baby girl [Breastfeeding] : currently nursing [None] : The patient is currently asymptomatic [Abdominal Pain] : no abdominal pain [Heavy Bleeding] : no heavy bleeding [Doing Well] : is doing well [FreeTextEntry8] : Postpartum hypertension- BP follow up [de-identified] : Ms. Connelly presents for BP follow up. She was seen at [facility] L&D on 2/4/18 due to postpartum hypertension. [facility] labs were negative except for urine pr/cr: 0.4 (possibly elevated due to postpartum lochia) and elevated uric acid. She had no pre-eclamptic symptoms. She was discharged home with Labetalol 100 mg BID. She reports most of her home BPs have been SBP 130s-140s. She reports being compliant with taking her Labetalol medication. Denies any headaches, blurry vision, chest pain, shortness of breath, or RUQ/epigastric pain.  [de-identified] : s/p C/S. Postpartum hypertension with no signs of pre-eclampsia. [de-identified] : 1) Increase Labetalol dosage from 100 mg BID to 200 mg BID. Hold parameters discussed. // 2) Continue measuring BPs twice daily. // 3) Consultation with Nephrology, Dr. Smalls, for continued BP management. // 4) Follow up in 1 week for BP check.

## 2019-02-13 ENCOUNTER — APPOINTMENT (OUTPATIENT)
Dept: NEPHROLOGY | Facility: CLINIC | Age: 34
End: 2019-02-13
Payer: COMMERCIAL

## 2019-02-13 VITALS
BODY MASS INDEX: 45.99 KG/M2 | HEIGHT: 67 IN | SYSTOLIC BLOOD PRESSURE: 140 MMHG | DIASTOLIC BLOOD PRESSURE: 86 MMHG | HEART RATE: 86 BPM | WEIGHT: 293 LBS | OXYGEN SATURATION: 98 %

## 2019-02-13 PROCEDURE — 99245 OFF/OP CONSLTJ NEW/EST HI 55: CPT | Mod: 25

## 2019-02-13 PROCEDURE — 36415 COLL VENOUS BLD VENIPUNCTURE: CPT

## 2019-02-13 RX ORDER — IBUPROFEN 600 MG/1
600 TABLET, FILM COATED ORAL
Qty: 30 | Refills: 0 | Status: DISCONTINUED | COMMUNITY
Start: 2019-01-24

## 2019-02-13 RX ORDER — OXYCODONE AND ACETAMINOPHEN 5; 325 MG/1; MG/1
5-325 TABLET ORAL
Qty: 10 | Refills: 0 | Status: DISCONTINUED | COMMUNITY
Start: 2019-01-24

## 2019-02-13 RX ORDER — METRONIDAZOLE 7.5 MG/G
0.75 GEL VAGINAL
Qty: 70 | Refills: 0 | Status: DISCONTINUED | COMMUNITY
Start: 2018-08-24

## 2019-02-13 NOTE — ASSESSMENT
[FreeTextEntry1] : 1.Post Partum HTN\par \par \par 2. Obesity, \par \par S : DASH , High K + Diet,\par \par Home BP BIW,  Exercise, Lose weight,\par \par RTO in 1 Month,

## 2019-02-13 NOTE — PHYSICAL EXAM
[General Appearance - Alert] : alert [General Appearance - In No Acute Distress] : in no acute distress [FreeTextEntry1] : Obese, [Sclera] : the sclera and conjunctiva were normal [PERRL With Normal Accommodation] : pupils were equal in size, round, and reactive to light [Extraocular Movements] : extraocular movements were intact [Outer Ear] : the ears and nose were normal in appearance [Oropharynx] : the oropharynx was normal [Neck Appearance] : the appearance of the neck was normal [Neck Cervical Mass (___cm)] : no neck mass was observed [Jugular Venous Distention Increased] : there was no jugular-venous distention [Thyroid Diffuse Enlargement] : the thyroid was not enlarged [Thyroid Nodule] : there were no palpable thyroid nodules [Auscultation Breath Sounds / Voice Sounds] : lungs were clear to auscultation bilaterally [Heart Rate And Rhythm] : heart rate was normal and rhythm regular [Heart Sounds] : normal S1 and S2 [Heart Sounds Gallop] : no gallops [Murmurs] : no murmurs [Heart Sounds Pericardial Friction Rub] : no pericardial rub [Bowel Sounds] : normal bowel sounds [Abdomen Soft] : soft [Abdomen Tenderness] : non-tender [Abdomen Mass (___ Cm)] : no abdominal mass palpated [Cervical Lymph Nodes Enlarged Posterior Bilaterally] : posterior cervical [Cervical Lymph Nodes Enlarged Anterior Bilaterally] : anterior cervical [Supraclavicular Lymph Nodes Enlarged Bilaterally] : supraclavicular [Axillary Lymph Nodes Enlarged Bilaterally] : axillary [Femoral Lymph Nodes Enlarged Bilaterally] : femoral [Inguinal Lymph Nodes Enlarged Bilaterally] : inguinal [No CVA Tenderness] : no ~M costovertebral angle tenderness [No Spinal Tenderness] : no spinal tenderness [Abnormal Walk] : normal gait [Nail Clubbing] : no clubbing  or cyanosis of the fingernails [Musculoskeletal - Swelling] : no joint swelling seen [Motor Tone] : muscle strength and tone were normal [Skin Color & Pigmentation] : normal skin color and pigmentation [Skin Turgor] : normal skin turgor [] : no rash [Deep Tendon Reflexes (DTR)] : deep tendon reflexes were 2+ and symmetric [Sensation] : the sensory exam was normal to light touch and pinprick [No Focal Deficits] : no focal deficits [Oriented To Time, Place, And Person] : oriented to person, place, and time [Impaired Insight] : insight and judgment were intact [Affect] : the affect was normal

## 2019-02-13 NOTE — HISTORY OF PRESENT ILLNESS
[FreeTextEntry1] : Post - Partum HTN On Labetalol,\par \par D.Darryn 3 days ago ( Manual BP Normal )\par \par Mother - RN , Breast Feeding,

## 2019-02-14 LAB
25(OH)D3 SERPL-MCNC: 28.3 NG/ML
ALBUMIN SERPL ELPH-MCNC: 4.1 G/DL
ANION GAP SERPL CALC-SCNC: 13 MMOL/L
APPEARANCE: ABNORMAL
BACTERIA: ABNORMAL
BASOPHILS # BLD AUTO: 0.03 K/UL
BASOPHILS NFR BLD AUTO: 0.4 %
BILIRUBIN URINE: NEGATIVE
BLOOD URINE: ABNORMAL
BUN SERPL-MCNC: 16 MG/DL
CALCIUM SERPL-MCNC: 9.7 MG/DL
CHLORIDE SERPL-SCNC: 105 MMOL/L
CO2 SERPL-SCNC: 22 MMOL/L
COLOR: YELLOW
CREAT SERPL-MCNC: 0.69 MG/DL
EOSINOPHIL # BLD AUTO: 0.46 K/UL
EOSINOPHIL NFR BLD AUTO: 6.2 %
GLUCOSE QUALITATIVE U: NEGATIVE MG/DL
GLUCOSE SERPL-MCNC: 81 MG/DL
HBA1C MFR BLD HPLC: 5.5 %
HCT VFR BLD CALC: 43.2 %
HGB BLD-MCNC: 13.3 G/DL
HYALINE CASTS: 0 /LPF
IMM GRANULOCYTES NFR BLD AUTO: 0.3 %
KETONES URINE: NEGATIVE
LEUKOCYTE ESTERASE URINE: ABNORMAL
LYMPHOCYTES # BLD AUTO: 2.58 K/UL
LYMPHOCYTES NFR BLD AUTO: 34.7 %
MAN DIFF?: NORMAL
MCHC RBC-ENTMCNC: 26.1 PG
MCHC RBC-ENTMCNC: 30.8 GM/DL
MCV RBC AUTO: 84.7 FL
MICROSCOPIC-UA: NORMAL
MONOCYTES # BLD AUTO: 0.59 K/UL
MONOCYTES NFR BLD AUTO: 7.9 %
NEUTROPHILS # BLD AUTO: 3.76 K/UL
NEUTROPHILS NFR BLD AUTO: 50.5 %
NITRITE URINE: NEGATIVE
PH URINE: 6
PHOSPHATE SERPL-MCNC: 4.5 MG/DL
PLATELET # BLD AUTO: 265 K/UL
POTASSIUM SERPL-SCNC: 4.7 MMOL/L
PROTEIN URINE: 30 MG/DL
RBC # BLD: 5.1 M/UL
RBC # FLD: 13.9 %
RED BLOOD CELLS URINE: 229 /HPF
SODIUM SERPL-SCNC: 140 MMOL/L
SPECIFIC GRAVITY URINE: 1.02
SQUAMOUS EPITHELIAL CELLS: 9 /HPF
URATE SERPL-MCNC: 7.2 MG/DL
UROBILINOGEN URINE: NEGATIVE MG/DL
WBC # FLD AUTO: 7.44 K/UL
WHITE BLOOD CELLS URINE: 371 /HPF

## 2019-02-15 ENCOUNTER — APPOINTMENT (OUTPATIENT)
Dept: OBGYN | Facility: CLINIC | Age: 34
End: 2019-02-15

## 2019-02-15 LAB
CREAT SPEC-SCNC: 121 MG/DL
CREAT/PROT UR: 0.4 RATIO
PROT UR-MCNC: 49 MG/DL

## 2019-02-19 LAB
BILIRUB UR QL STRIP: NORMAL
COLLECTION METHOD: NORMAL
GLUCOSE UR-MCNC: NORMAL
HCG UR QL: 0.2 EU/DL
HCG UR QL: NEGATIVE
HGB UR QL STRIP.AUTO: ABNORMAL
KETONES UR-MCNC: NORMAL
LEUKOCYTE ESTERASE UR QL STRIP: ABNORMAL
NITRITE UR QL STRIP: NORMAL
PH UR STRIP: 6.5
PROT UR STRIP-MCNC: 30
QUALITY CONTROL: YES
SP GR UR STRIP: 1.02

## 2019-02-28 ENCOUNTER — APPOINTMENT (OUTPATIENT)
Dept: OBGYN | Facility: CLINIC | Age: 34
End: 2019-02-28
Payer: COMMERCIAL

## 2019-02-28 VITALS
HEIGHT: 67 IN | SYSTOLIC BLOOD PRESSURE: 122 MMHG | WEIGHT: 293 LBS | DIASTOLIC BLOOD PRESSURE: 78 MMHG | BODY MASS INDEX: 45.99 KG/M2

## 2019-02-28 DIAGNOSIS — O99.342 OTHER MENTAL DISORDERS COMPLICATING PREGNANCY, SECOND TRIMESTER: ICD-10-CM

## 2019-02-28 DIAGNOSIS — O99.210 OBESITY COMPLICATING PREGNANCY, UNSPECIFIED TRIMESTER: ICD-10-CM

## 2019-02-28 DIAGNOSIS — F41.9 OTHER MENTAL DISORDERS COMPLICATING PREGNANCY, SECOND TRIMESTER: ICD-10-CM

## 2019-02-28 DIAGNOSIS — Z98.891 HISTORY OF UTERINE SCAR FROM PREVIOUS SURGERY: ICD-10-CM

## 2019-02-28 DIAGNOSIS — R51 OTHER SPECIFIED PREGNANCY RELATED CONDITIONS, THIRD TRIMESTER: ICD-10-CM

## 2019-02-28 DIAGNOSIS — O26.893 OTHER SPECIFIED PREGNANCY RELATED CONDITIONS, THIRD TRIMESTER: ICD-10-CM

## 2019-02-28 DIAGNOSIS — Z78.9 OTHER SPECIFIED HEALTH STATUS: ICD-10-CM

## 2019-02-28 LAB
BILIRUB UR QL STRIP: NORMAL
COLLECTION METHOD: NORMAL
GLUCOSE UR-MCNC: NORMAL
HCG UR QL: 0.2 EU/DL
HCG UR QL: NEGATIVE
HGB UR QL STRIP.AUTO: ABNORMAL
KETONES UR-MCNC: NORMAL
LEUKOCYTE ESTERASE UR QL STRIP: ABNORMAL
NITRITE UR QL STRIP: NORMAL
PH UR STRIP: 7
PROT UR STRIP-MCNC: ABNORMAL
QUALITY CONTROL: YES
SP GR UR STRIP: 1.02

## 2019-02-28 PROCEDURE — 81025 URINE PREGNANCY TEST: CPT

## 2019-02-28 PROCEDURE — 0503F POSTPARTUM CARE VISIT: CPT

## 2019-02-28 PROCEDURE — 81003 URINALYSIS AUTO W/O SCOPE: CPT | Mod: QW

## 2019-03-01 NOTE — HISTORY OF PRESENT ILLNESS
[Postpartum Follow Up] : postpartum follow up [Last Pap Date: ___] : Last Pap Date: [unfilled] [Delivery Date: ___] : on [unfilled] [Repeat C/S] : delivered by  section (repeat) [Female] : Delivery History: baby girl [Wt. ___] : weighing [unfilled] [Girl] : baby is a girl [Infant's Name ___] : [unfilled] [___ Lbs] : [unfilled] lbs [___ Oz] : [unfilled] oz [Breastfeeding] : currently nursing [Clean/Dry/Intact] : clean, dry and intact [Healed] : healed [Mild] : mild vaginal bleeding [Normal] : the vagina was normal [Examination Of The Breasts] : breasts are normal [Doing Well] : is doing well [No Sign of Infection] : is showing no signs of infection [None] : None [BF with Difficulty] : nursing without difficulty [Abdominal Pain] : no abdominal pain [Back Pain] : no back pain [Breast Pain] : no breast pain [Chest Pain] : no chest pain [Cracked Nipples] : no cracked nipples [S/Sx PP Depression] : no signs/symptoms of postpartum depression [Heavy Bleeding] : no heavy bleeding [Incisional Drainage] : no incisional drainage [Incisional Pain] : no incisional pain [Leg Pain] : no leg pain [Shortness of Breath] : no shortness of breath [Suicidal Ideation] : no suicidal ideation [Vaginal Discharge] : no vaginal discharge [Chills] : no chills [Fatigue] : no fatigue [Dysuria] : no dysuria [Fever] : no fever [Headache] : no headache [Nausea] : no nausea [Vomiting] : no vomiting [Cervix Sample Taken] : cervical sample not taken for a Pap smear [FreeTextEntry8] : P [de-identified] : 33 year old  3 para , who underwent repeat  section of an 8 lbs 4 oz female infant, "Natalie" on 2019, is here for a routine postpartum visit.  \par \par TEETEE is nursing without difficulty and feels well.  She has good support denying any signs or symptoms of depression.\par \par Of note, TEETEE experienced elevated blood pressures two weeks postpartum and visited Labor and Delivery where she was ruled out for preeclampsia.  She was initially started on a low dose of Labetalol and followed up with Dr. Small from nephrology.  Currently she is normotensive on no medication.  Weight loss via healthy diet and exercise was encouraged.                                                                                                                                                                 We discussed alternatives for contraception including the attendant risks, benefits and complications of each.  She expressed good understanding of the information.  As she is unsure about future fertility, she declined a long acting reversible contraceptive and wishes to use condoms for now.  \par \par All of her concerns were addressed, questions answered and reassurance was given. [de-identified] : Return to office for annual exam or sooner as needed.

## 2019-03-19 NOTE — ED ADULT NURSE NOTE - NS ED PATIENT SAFETY CONCERN
Patient arrives with c/o mid sternal chest pressure and tightness that woke her from sleeping; radiation to neck. Denies SOB. + nausea. No

## 2019-03-29 ENCOUNTER — APPOINTMENT (OUTPATIENT)
Dept: NEPHROLOGY | Facility: CLINIC | Age: 34
End: 2019-03-29
Payer: COMMERCIAL

## 2019-03-29 VITALS
HEART RATE: 85 BPM | WEIGHT: 293 LBS | SYSTOLIC BLOOD PRESSURE: 120 MMHG | BODY MASS INDEX: 45.99 KG/M2 | DIASTOLIC BLOOD PRESSURE: 80 MMHG | HEIGHT: 67 IN | OXYGEN SATURATION: 96 %

## 2019-03-29 DIAGNOSIS — E66.01 MORBID (SEVERE) OBESITY DUE TO EXCESS CALORIES: ICD-10-CM

## 2019-03-29 PROCEDURE — 36415 COLL VENOUS BLD VENIPUNCTURE: CPT

## 2019-03-29 PROCEDURE — 99215 OFFICE O/P EST HI 40 MIN: CPT | Mod: 25

## 2019-03-29 NOTE — ASSESSMENT
[FreeTextEntry1] : 1.Post Partum HTN\par \par 2. Obesity, \par \par S : DASH , High K + Diet,\par \par Home BP BIW,  Exercise, Lose weight,\par \par Considering  Gastric Sleeve, \par \par On Pre Suzie Vitamin QD, \par

## 2019-03-29 NOTE — HISTORY OF PRESENT ILLNESS
[FreeTextEntry1] : Post - Partum HTN, Treated w.  Labetalol, Now Off, \par \par Mother - RN , \par \par Breast Feeding,

## 2019-03-29 NOTE — PHYSICAL EXAM
[General Appearance - Alert] : alert [General Appearance - In No Acute Distress] : in no acute distress [Sclera] : the sclera and conjunctiva were normal [PERRL With Normal Accommodation] : pupils were equal in size, round, and reactive to light [Extraocular Movements] : extraocular movements were intact [Outer Ear] : the ears and nose were normal in appearance [Oropharynx] : the oropharynx was normal [Neck Appearance] : the appearance of the neck was normal [Neck Cervical Mass (___cm)] : no neck mass was observed [Jugular Venous Distention Increased] : there was no jugular-venous distention [Thyroid Diffuse Enlargement] : the thyroid was not enlarged [Thyroid Nodule] : there were no palpable thyroid nodules [Auscultation Breath Sounds / Voice Sounds] : lungs were clear to auscultation bilaterally [Heart Rate And Rhythm] : heart rate was normal and rhythm regular [Heart Sounds] : normal S1 and S2 [Heart Sounds Gallop] : no gallops [Murmurs] : no murmurs [Heart Sounds Pericardial Friction Rub] : no pericardial rub [Bowel Sounds] : normal bowel sounds [Abdomen Soft] : soft [Abdomen Tenderness] : non-tender [Abdomen Mass (___ Cm)] : no abdominal mass palpated [Cervical Lymph Nodes Enlarged Posterior Bilaterally] : posterior cervical [Cervical Lymph Nodes Enlarged Anterior Bilaterally] : anterior cervical [Supraclavicular Lymph Nodes Enlarged Bilaterally] : supraclavicular [Axillary Lymph Nodes Enlarged Bilaterally] : axillary [Femoral Lymph Nodes Enlarged Bilaterally] : femoral [Inguinal Lymph Nodes Enlarged Bilaterally] : inguinal [No CVA Tenderness] : no ~M costovertebral angle tenderness [No Spinal Tenderness] : no spinal tenderness [Abnormal Walk] : normal gait [Nail Clubbing] : no clubbing  or cyanosis of the fingernails [Musculoskeletal - Swelling] : no joint swelling seen [Motor Tone] : muscle strength and tone were normal [Skin Color & Pigmentation] : normal skin color and pigmentation [Skin Turgor] : normal skin turgor [Deep Tendon Reflexes (DTR)] : deep tendon reflexes were 2+ and symmetric [Sensation] : the sensory exam was normal to light touch and pinprick [No Focal Deficits] : no focal deficits [Oriented To Time, Place, And Person] : oriented to person, place, and time [Impaired Insight] : insight and judgment were intact [Affect] : the affect was normal [General Appearance - Well Nourished] : well nourished [General Appearance - Well Developed] : well developed [Strabismus] : no strabismus was seen [Retina] : no retinal hemorrhages, vessel changes or exudates seen on fundoscopic exam [Hearing Threshold Finger Rub Not Knott] : hearing was normal [Examination Of The Oral Cavity] : the lips and gums were normal [Both Tympanic Membranes Were Examined] : both tympanic membranes were normal [Nasal Cavity] : the nasal mucosa and septum were normal [] : the neck was supple [Respiration, Rhythm And Depth] : normal respiratory rhythm and effort [Exaggerated Use Of Accessory Muscles For Inspiration] : no accessory muscle use [Chest Palpation] : palpation of the chest revealed no abnormalities [Lungs Percussion] : the lungs were normal to percussion [Arterial Pulses Carotid] : carotid pulses were normal with no bruits [Edema] : there was no peripheral edema [Veins - Varicosity Changes] : there were no varicosital changes [Breast Appearance] : normal in appearance [Breast Palpation Mass] : no palpable masses [Breast Abnormal Lactation (Galactorrhea)] : no nipple discharge [Abdomen Hernia] : no hernia was discovered [Urinary Bladder Findings] : the bladder was normal on palpation [Involuntary Movements] : no involuntary movements were seen [Skin Lesions] : no skin lesions [Motor Exam] : the motor exam was normal [Memory Recent] : recent memory was not impaired [Memory Remote] : remote memory was not impaired [FreeTextEntry1] : Obese,

## 2019-03-30 LAB
25(OH)D3 SERPL-MCNC: 26.1 NG/ML
ALBUMIN SERPL ELPH-MCNC: 4.3 G/DL
ANION GAP SERPL CALC-SCNC: 13 MMOL/L
APPEARANCE: CLEAR
BACTERIA: ABNORMAL
BASOPHILS # BLD AUTO: 0.04 K/UL
BASOPHILS NFR BLD AUTO: 0.5 %
BILIRUBIN URINE: NEGATIVE
BLOOD URINE: NEGATIVE
BUN SERPL-MCNC: 18 MG/DL
CALCIUM SERPL-MCNC: 9.4 MG/DL
CHLORIDE SERPL-SCNC: 103 MMOL/L
CO2 SERPL-SCNC: 25 MMOL/L
COLOR: NORMAL
CREAT SERPL-MCNC: 0.86 MG/DL
CREAT SPEC-SCNC: 80 MG/DL
CREAT/PROT UR: 0.1 RATIO
EOSINOPHIL # BLD AUTO: 0.21 K/UL
EOSINOPHIL NFR BLD AUTO: 2.8 %
ESTIMATED AVERAGE GLUCOSE: 108 MG/DL
GLUCOSE QUALITATIVE U: NEGATIVE
GLUCOSE SERPL-MCNC: 92 MG/DL
HBA1C MFR BLD HPLC: 5.4 %
HCT VFR BLD CALC: 43.5 %
HGB BLD-MCNC: 13.6 G/DL
HYALINE CASTS: 1 /LPF
IMM GRANULOCYTES NFR BLD AUTO: 0.3 %
KETONES URINE: NEGATIVE
LEUKOCYTE ESTERASE URINE: ABNORMAL
LYMPHOCYTES # BLD AUTO: 2.75 K/UL
LYMPHOCYTES NFR BLD AUTO: 36 %
MAN DIFF?: NORMAL
MCHC RBC-ENTMCNC: 26.8 PG
MCHC RBC-ENTMCNC: 31.3 GM/DL
MCV RBC AUTO: 85.8 FL
MICROSCOPIC-UA: NORMAL
MONOCYTES # BLD AUTO: 0.52 K/UL
MONOCYTES NFR BLD AUTO: 6.8 %
NEUTROPHILS # BLD AUTO: 4.09 K/UL
NEUTROPHILS NFR BLD AUTO: 53.6 %
NITRITE URINE: NEGATIVE
PH URINE: 6.5
PHOSPHATE SERPL-MCNC: 3.6 MG/DL
PLATELET # BLD AUTO: 276 K/UL
POTASSIUM SERPL-SCNC: 4.7 MMOL/L
PROT UR-MCNC: 4 MG/DL
PROTEIN URINE: NEGATIVE
RBC # BLD: 5.07 M/UL
RBC # FLD: 13.7 %
RED BLOOD CELLS URINE: 2 /HPF
SODIUM SERPL-SCNC: 141 MMOL/L
SPECIFIC GRAVITY URINE: 1.02
SQUAMOUS EPITHELIAL CELLS: 5 /HPF
UROBILINOGEN URINE: NORMAL
WBC # FLD AUTO: 7.63 K/UL
WHITE BLOOD CELLS URINE: 5 /HPF

## 2019-05-29 ENCOUNTER — TRANSCRIPTION ENCOUNTER (OUTPATIENT)
Age: 34
End: 2019-05-29

## 2019-06-19 NOTE — OB RN PATIENT PROFILE - NS PRO PT RIGHT SUPPORT PERSON
same name as above Merkel Cell Carcinoma Histology Text: In the dermis and extending into the subcutaneous tissue, there are multiple anastomosing cords, clusters and sheets of basophilic cells.  These collections of cells do not appear to be connected to the epidermis.  Individual tumor cells have a very homogenous appearance.  They are closely spaced round ells that stain intensely blue with hematoxylin eosin.  These cells have vesiculare nuclei and scantly ill -defined cytoplasm.  Mitotic figures are seen.

## 2020-02-27 ENCOUNTER — APPOINTMENT (OUTPATIENT)
Dept: OBGYN | Facility: CLINIC | Age: 35
End: 2020-02-27
Payer: COMMERCIAL

## 2020-02-27 VITALS
BODY MASS INDEX: 37.35 KG/M2 | SYSTOLIC BLOOD PRESSURE: 116 MMHG | DIASTOLIC BLOOD PRESSURE: 80 MMHG | WEIGHT: 238 LBS | HEIGHT: 67 IN

## 2020-02-27 DIAGNOSIS — F41.9 ANXIETY DISORDER, UNSPECIFIED: ICD-10-CM

## 2020-02-27 DIAGNOSIS — N92.0 EXCESSIVE AND FREQUENT MENSTRUATION WITH REGULAR CYCLE: ICD-10-CM

## 2020-02-27 DIAGNOSIS — L70.9 ACNE, UNSPECIFIED: ICD-10-CM

## 2020-02-27 DIAGNOSIS — N94.2 VAGINISMUS: ICD-10-CM

## 2020-02-27 DIAGNOSIS — L68.0 HIRSUTISM: ICD-10-CM

## 2020-02-27 DIAGNOSIS — Z30.09 ENCOUNTER FOR OTHER GENERAL COUNSELING AND ADVICE ON CONTRACEPTION: ICD-10-CM

## 2020-02-27 DIAGNOSIS — Z12.4 ENCOUNTER FOR SCREENING FOR MALIGNANT NEOPLASM OF CERVIX: ICD-10-CM

## 2020-02-27 DIAGNOSIS — Z84.89 FAMILY HISTORY OF OTHER SPECIFIED CONDITIONS: ICD-10-CM

## 2020-02-27 DIAGNOSIS — Z80.0 FAMILY HISTORY OF MALIGNANT NEOPLASM OF DIGESTIVE ORGANS: ICD-10-CM

## 2020-02-27 DIAGNOSIS — O99.210 OBESITY COMPLICATING PREGNANCY, UNSPECIFIED TRIMESTER: ICD-10-CM

## 2020-02-27 DIAGNOSIS — Z83.49 FAMILY HISTORY OF OTHER ENDOCRINE, NUTRITIONAL AND METABOLIC DISEASES: ICD-10-CM

## 2020-02-27 DIAGNOSIS — Z30.02 COUNSELING AND INSTRUCTION IN NATURAL FAMILY PLANNING TO AVOID PREGNANCY: ICD-10-CM

## 2020-02-27 DIAGNOSIS — Z83.3 FAMILY HISTORY OF DIABETES MELLITUS: ICD-10-CM

## 2020-02-27 DIAGNOSIS — E28.2 POLYCYSTIC OVARIAN SYNDROME: ICD-10-CM

## 2020-02-27 DIAGNOSIS — Z12.39 ENCOUNTER FOR OTHER SCREENING FOR MALIGNANT NEOPLASM OF BREAST: ICD-10-CM

## 2020-02-27 PROCEDURE — 99395 PREV VISIT EST AGE 18-39: CPT

## 2020-02-27 RX ORDER — PNV/FERROUS SULFATE/FOLIC ACID 27-<0.5MG
TABLET ORAL
Refills: 0 | Status: DISCONTINUED | COMMUNITY
End: 2020-02-27

## 2020-02-27 RX ORDER — MULTIVIT-MIN/IRON/FOLIC ACID/K 45-800-120
CAPSULE ORAL
Refills: 0 | Status: ACTIVE | COMMUNITY

## 2020-02-27 RX ORDER — VIT A AND D3 IN COD LIVER OIL 1250-135
1000 CAPSULE ORAL
Refills: 0 | Status: ACTIVE | COMMUNITY

## 2020-02-28 LAB — HPV HIGH+LOW RISK DNA PNL CVX: NOT DETECTED

## 2020-03-01 PROBLEM — L70.9 ACNE, UNSPECIFIED ACNE TYPE: Status: ACTIVE | Noted: 2018-12-14

## 2020-03-01 PROBLEM — Z83.3 FAMILY HISTORY OF DIABETES MELLITUS: Status: ACTIVE | Noted: 2019-02-28

## 2020-03-01 PROBLEM — F41.9 ANXIETY: Status: ACTIVE | Noted: 2018-07-27

## 2020-03-01 PROBLEM — Z83.49 FAMILY HISTORY OF HYPOTHYROIDISM: Status: ACTIVE | Noted: 2020-03-01

## 2020-03-01 PROBLEM — Z84.89 FAMILY HISTORY OF NEOPLASM OF BREAST: Status: ACTIVE | Noted: 2020-03-01

## 2020-03-01 PROBLEM — E28.2 PCOS (POLYCYSTIC OVARIAN SYNDROME): Status: ACTIVE | Noted: 2020-03-01

## 2020-03-01 PROBLEM — Z30.02 NATURAL FAMILY PLANNING: Status: ACTIVE | Noted: 2020-03-01

## 2020-03-01 PROBLEM — Z30.09 GENERAL COUNSELLING AND ADVICE ON CONTRACEPTION: Status: ACTIVE | Noted: 2019-02-28

## 2020-03-01 PROBLEM — N94.2 VAGINISMUS: Status: RESOLVED | Noted: 2018-12-14 | Resolved: 2020-03-01

## 2020-03-01 PROBLEM — L68.0 FEMALE HIRSUTISM: Status: ACTIVE | Noted: 2020-03-01

## 2020-03-01 PROBLEM — O99.210 OBESITY DURING PREGNANCY: Status: RESOLVED | Noted: 2019-01-07 | Resolved: 2020-03-01

## 2020-03-01 PROBLEM — Z80.0 FAMILY HISTORY OF COLON CANCER: Status: ACTIVE | Noted: 2020-03-01

## 2020-03-01 PROBLEM — N92.0 MENORRHAGIA WITH REGULAR CYCLE: Status: ACTIVE | Noted: 2020-03-01

## 2020-03-01 PROBLEM — Z12.39 BREAST CANCER SCREENING: Status: ACTIVE | Noted: 2020-03-01

## 2020-03-01 NOTE — DISCUSSION/SUMMARY
[FreeTextEntry1] : 34 year old  3 para , whose last menstrual period was 2020, is here for an annual exam.  Her Pap smear from 2017 was negative and no human papilloma virus was detected.\par \par TEETEE had a gastric sleeve surgery on 2019 and has had a significant amount of weight loss.  Having been readmitted with postpartum hypertension she realized "I had to do something."  Positive reinforcement was offered and adherence to the regimen was encouraged.\par \par She has a history of polycystic ovarian syndrome but her periods are monthly periods. The patient  complaints that for "two to three days they are very heavy with clots."  The flow tapers off and total days of menses is five to six.  She has no cramping but worsening anxiety with her periods.  We talked about alternatives for management including the attendant risks, benefits, complications.\par \par Monthly self breast examination was advised.\par \par All concerns were addressed, questions answered and reassurance was given.

## 2020-03-01 NOTE — PHYSICAL EXAM
[Alert] : alert [Awake] : awake [Soft] : soft [Oriented x3] : oriented to person, place, and time [No Bleeding] : there was no active vaginal bleeding [Uterine Adnexae] : were not tender and not enlarged [Thyroid Nodule] : no thyroid nodule [Acute Distress] : no acute distress [LAD] : no lymphadenopathy [Goiter] : no goiter [Mass] : no breast mass [Axillary LAD] : no axillary lymphadenopathy [Tender] : non tender [Nipple Discharge] : no nipple discharge [Labia Majora] : labia major [Normal] : clitoris [Labia Minora] : labia minora [CTAB] : CTAB [RRR, No Murmurs] : RRR, no murmurs

## 2020-03-01 NOTE — HISTORY OF PRESENT ILLNESS
[1 Year Ago] : 1 year ago [Healthy Diet] : a healthy diet [Good] : being in good health [Definite:  ___ (Date)] : the last menstrual period was [unfilled] [Sexually Active] : is sexually active [Monogamous] : is monogamous [Male ___] : [unfilled] male [Heavy Bleeding] : described as heavy in severity [Last Pap Smear ___] : last Papanicolaou cytology done [unfilled] [No Previous Mammogram] : no previous mammogram [No Previous Colonoscopy] : no previous colonoscopy [Menarche Age: ____] : age at menarche was [unfilled] [Regular Exercise] : not exercising regularly [Reproductive Age] : is of reproductive age [Menstrual Problems] : reports normal menses [Weight Concerns] : no concerns with her weight [Condoms] : uses condoms [Natural Family Planning] : uses natural family planning [Up to Date] : not up to date with ~his/her~ immunizations [Continuous] : no continuous [Sharp] : no sharp [Dull] : no dull [Stabbing] : no stabbing [Throbbing] : no throbbing [Burning] : no burning [Itching] : no itching [Mass] : no mass [Stinging] : no stinging [Lesion] : no lesion [Discharge] : no discharge [Soreness] : no soreness [Irregular Menses] : normal menses [Localized Pain] : no localized pain [Mass (___cm)] : no palpable mass [Diffused Pain] : no diffused pain [Skin Color Change] : no skin color change [Nipple Discharge] : no nipple discharge [Hot Flashes] : no hot flashes [Night Sweats] : no night sweats [Vaginal Itching] : no vaginal itching [Dyspareunia] : no dyspareunia [Mood Changes] : no mood changes [Contraception] : does not use contraception

## 2020-03-01 NOTE — COUNSELING
[Nutrition] : nutrition [Breast Self Exam] : breast self exam [Exercise] : exercise [Contraception] : contraception [Vitamins/Supplements] : vitamins/supplements [Weight Management] : weight management

## 2020-06-24 ENCOUNTER — APPOINTMENT (OUTPATIENT)
Dept: OBGYN | Facility: CLINIC | Age: 35
End: 2020-06-24
Payer: COMMERCIAL

## 2020-06-24 PROCEDURE — XXXXX: CPT

## 2020-07-10 ENCOUNTER — APPOINTMENT (OUTPATIENT)
Dept: OBGYN | Facility: CLINIC | Age: 35
End: 2020-07-10

## 2020-07-23 ENCOUNTER — RESULT REVIEW (OUTPATIENT)
Age: 35
End: 2020-07-23

## 2020-12-10 ENCOUNTER — TRANSCRIPTION ENCOUNTER (OUTPATIENT)
Age: 35
End: 2020-12-10

## 2021-06-06 NOTE — OB RN PATIENT PROFILE - PRO RUBELLA INFANT
RN cannot approve Refill Request    RN can NOT refill this medication med is not covered by policy/route to provider. Last office visit: 12/24/2019 Enio Lechuga MD Last Physical: Visit date not found Last MTM visit: Visit date not found Last visit same specialty: 12/24/2019 Enio Lechuga MD.  Next visit within 3 mo: Visit date not found  Next physical within 3 mo: Visit date not found      Kacie Mayes, [facility] Triage/Med Refill 2/15/2020    Requested Prescriptions   Pending Prescriptions Disp Refills     tiZANidine (ZANAFLEX) 4 MG tablet [Pharmacy Med Name: TIZANIDINE 4MG TABLETS] 30 tablet 1     Sig: TAKE 1 TABLET(4 MG) BY MOUTH TWICE DAILY AS NEEDED       There is no refill protocol information for this order            immune

## 2021-11-30 ENCOUNTER — APPOINTMENT (OUTPATIENT)
Dept: OBGYN | Facility: CLINIC | Age: 36
End: 2021-11-30
Payer: COMMERCIAL

## 2021-11-30 VITALS
DIASTOLIC BLOOD PRESSURE: 82 MMHG | HEIGHT: 67 IN | BODY MASS INDEX: 34.53 KG/M2 | SYSTOLIC BLOOD PRESSURE: 124 MMHG | WEIGHT: 220 LBS

## 2021-11-30 DIAGNOSIS — N93.9 ABNORMAL UTERINE AND VAGINAL BLEEDING, UNSPECIFIED: ICD-10-CM

## 2021-11-30 LAB
BILIRUB UR QL STRIP: NORMAL
GLUCOSE UR-MCNC: NORMAL
HCG UR QL: 0.2 EU/DL
HCG UR QL: NEGATIVE
HGB UR QL STRIP.AUTO: NORMAL
KETONES UR-MCNC: NORMAL
LEUKOCYTE ESTERASE UR QL STRIP: NORMAL
NITRITE UR QL STRIP: NORMAL
PH UR STRIP: 7
PROT UR STRIP-MCNC: NORMAL
SP GR UR STRIP: 1.02

## 2021-11-30 PROCEDURE — 99214 OFFICE O/P EST MOD 30 MIN: CPT

## 2021-11-30 NOTE — HISTORY OF PRESENT ILLNESS
[FreeTextEntry1] : History: gastric sleeve 7/19\par 6/2020 had pelvic MRI showing 2cm possible endometrioma on right side abd wall in area of previous cs scar.\par Has surgery 7/2020 at [facility] showed endometriosis in abd wall mass.\par Menses then became regular, 5 days, no more pain\par past 1.5 years mense have been fine.\par 10/30 had normal period\par 2 weeks later started bleeding again, started with spotting, then heavy over the weekend seemed like a period still bleeding, regular flow lighter than the weekend.\par This past weekend felt pain, took ibuprofen and tylenol which helped. \par Pain on both sides, feels like cramps.\par SA does nothing for bc, not looking to get pregnant. Condoms, withdrawal\par Lost 110lbs from gastric sleeve.\par

## 2021-11-30 NOTE — PLAN
[FreeTextEntry1] : AUB x 1. Will check sono and call with results. FU for annual with pap and reeval of menstrual cycles. Continue to monitor.

## 2021-11-30 NOTE — PHYSICAL EXAM
[Appropriately responsive] : appropriately responsive [Alert] : alert [No Acute Distress] : no acute distress [Soft] : soft [Non-tender] : non-tender [Non-distended] : non-distended [No HSM] : No HSM [No Lesions] : no lesions [No Mass] : no mass [Oriented x3] : oriented x3 [Labia Majora] : normal [Labia Minora] : normal [Scant] : There was scant vaginal bleeding [Normal] : normal [Uterine Adnexae] : non-palpable [Tenderness] : nontender [Enlarged ___ wks] : not enlarged [Mass ___ cm] : no uterine mass was palpated

## 2021-12-02 ENCOUNTER — APPOINTMENT (OUTPATIENT)
Dept: ULTRASOUND IMAGING | Facility: CLINIC | Age: 36
End: 2021-12-02
Payer: COMMERCIAL

## 2021-12-02 ENCOUNTER — OUTPATIENT (OUTPATIENT)
Dept: OUTPATIENT SERVICES | Facility: HOSPITAL | Age: 36
LOS: 1 days | End: 2021-12-02
Payer: COMMERCIAL

## 2021-12-02 DIAGNOSIS — N93.9 ABNORMAL UTERINE AND VAGINAL BLEEDING, UNSPECIFIED: ICD-10-CM

## 2021-12-02 PROCEDURE — 76830 TRANSVAGINAL US NON-OB: CPT | Mod: 26

## 2021-12-02 PROCEDURE — 76830 TRANSVAGINAL US NON-OB: CPT

## 2021-12-02 PROCEDURE — 76856 US EXAM PELVIC COMPLETE: CPT | Mod: 26

## 2021-12-02 PROCEDURE — 76856 US EXAM PELVIC COMPLETE: CPT

## 2022-04-23 ENCOUNTER — TRANSCRIPTION ENCOUNTER (OUTPATIENT)
Age: 37
End: 2022-04-23

## 2022-07-07 ENCOUNTER — APPOINTMENT (OUTPATIENT)
Dept: OBGYN | Facility: CLINIC | Age: 37
End: 2022-07-07

## 2022-07-07 VITALS
DIASTOLIC BLOOD PRESSURE: 76 MMHG | TEMPERATURE: 98 F | BODY MASS INDEX: 34.93 KG/M2 | WEIGHT: 223 LBS | SYSTOLIC BLOOD PRESSURE: 122 MMHG

## 2022-07-07 DIAGNOSIS — Z01.419 ENCOUNTER FOR GYNECOLOGICAL EXAMINATION (GENERAL) (ROUTINE) W/OUT ABNORMAL FINDINGS: ICD-10-CM

## 2022-07-07 PROCEDURE — 99395 PREV VISIT EST AGE 18-39: CPT

## 2022-07-08 NOTE — PHYSICAL EXAM
[Appropriately responsive] : appropriately responsive [Alert] : alert [No Acute Distress] : no acute distress [Soft] : soft [Non-tender] : non-tender [Non-distended] : non-distended [No HSM] : No HSM [No Lesions] : no lesions [No Mass] : no mass [Oriented x3] : oriented x3 [Examination Of The Breasts] : a normal appearance [No Masses] : no breast masses were palpable [Labia Majora] : normal [Labia Minora] : normal [Scant] : There was scant vaginal bleeding [Normal] : normal [Tenderness] : nontender [Enlarged ___ wks] : not enlarged [Mass ___ cm] : no uterine mass was palpated [Uterine Adnexae] : non-palpable [FreeTextEntry5] : pap done

## 2022-07-08 NOTE — HISTORY OF PRESENT ILLNESS
[No] : Patient does not have concerns regarding sex [Currently Active] : currently active [Men] : men [Vaginal] : vaginal [Condoms] : Condoms [TextBox_4] : COVID in May had period end of may then 2 weeks later, other than that periods have been fine since November and pain good (hx of endo)\par june 15th wast was LMP [PapSmeardate] : 2/2020

## 2022-07-09 LAB — HPV HIGH+LOW RISK DNA PNL CVX: NOT DETECTED

## 2022-07-15 LAB — CYTOLOGY CVX/VAG DOC THIN PREP: ABNORMAL

## 2022-08-18 ENCOUNTER — RESULT REVIEW (OUTPATIENT)
Age: 37
End: 2022-08-18

## 2022-11-10 ENCOUNTER — NON-APPOINTMENT (OUTPATIENT)
Age: 37
End: 2022-11-10

## 2023-01-01 ENCOUNTER — NON-APPOINTMENT (OUTPATIENT)
Age: 38
End: 2023-01-01

## 2023-05-03 ENCOUNTER — TRANSCRIPTION ENCOUNTER (OUTPATIENT)
Age: 38
End: 2023-05-03

## 2023-06-02 ENCOUNTER — TRANSCRIPTION ENCOUNTER (OUTPATIENT)
Age: 38
End: 2023-06-02

## 2023-12-20 NOTE — OB RN PATIENT PROFILE - NS PRO TALK SOMEONE YN
Normal gynecological physical examination.  Self-breast examination stressed.  Mammogram ordered.  Discussed regular exercise, healthy diet, importance of vitamin D and calcium supplements.  Discussed importance of sun block use during periods of prolonged sun exposure.  Patient will be seen in 1 year for routine gynecologic and medical examination.  Patient will call office for any problems, concerns, or issues which may arise during the interim.     no

## 2024-11-08 ENCOUNTER — APPOINTMENT (OUTPATIENT)
Dept: COLORECTAL SURGERY | Facility: CLINIC | Age: 39
End: 2024-11-08
Payer: COMMERCIAL

## 2024-11-08 VITALS
SYSTOLIC BLOOD PRESSURE: 128 MMHG | RESPIRATION RATE: 16 BRPM | OXYGEN SATURATION: 99 % | DIASTOLIC BLOOD PRESSURE: 86 MMHG | WEIGHT: 230 LBS | HEIGHT: 67 IN | BODY MASS INDEX: 36.1 KG/M2 | HEART RATE: 66 BPM

## 2024-11-08 DIAGNOSIS — K64.5 PERIANAL VENOUS THROMBOSIS: ICD-10-CM

## 2024-11-08 DIAGNOSIS — K64.8 OTHER HEMORRHOIDS: ICD-10-CM

## 2024-11-08 PROCEDURE — 99204 OFFICE O/P NEW MOD 45 MIN: CPT | Mod: 25

## 2024-11-08 PROCEDURE — 46600 DIAGNOSTIC ANOSCOPY SPX: CPT

## 2024-11-14 ENCOUNTER — APPOINTMENT (OUTPATIENT)
Dept: OBGYN | Facility: CLINIC | Age: 39
End: 2024-11-14
Payer: COMMERCIAL

## 2024-11-14 VITALS — WEIGHT: 234 LBS | DIASTOLIC BLOOD PRESSURE: 72 MMHG | SYSTOLIC BLOOD PRESSURE: 124 MMHG | BODY MASS INDEX: 36.65 KG/M2

## 2024-11-14 DIAGNOSIS — Z01.419 ENCOUNTER FOR GYNECOLOGICAL EXAMINATION (GENERAL) (ROUTINE) W/OUT ABNORMAL FINDINGS: ICD-10-CM

## 2024-11-14 DIAGNOSIS — N94.3 PREMENSTRUAL TENSION SYNDROME: ICD-10-CM

## 2024-11-14 PROCEDURE — 99213 OFFICE O/P EST LOW 20 MIN: CPT | Mod: 25

## 2024-11-14 PROCEDURE — 99396 PREV VISIT EST AGE 40-64: CPT

## 2024-11-15 LAB — HPV HIGH+LOW RISK DNA PNL CVX: NOT DETECTED

## 2024-11-18 LAB — CYTOLOGY CVX/VAG DOC THIN PREP: ABNORMAL

## 2025-02-11 ENCOUNTER — RESULT REVIEW (OUTPATIENT)
Age: 40
End: 2025-02-11

## 2025-03-17 NOTE — H&P PST ADULT - DOES PATIENT MEET CRITERIA FOR SEPSIS
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 Patient is currently back at her neurologic baseline. Restarted blina on 3/7, at 1/3 dose (5 mcg/m2/day). Potassium remains stable. Nephrology following. Will continue to monitor. Endo consulted due to concern for adrenal insufficiency. AM cortisol< 0.3, but repeat 7.7. ACTH elevated at 109. Per discussion with endo, no physiologic steroids needed but will create stress dose plan in event of fever and/or sepsis. Plan to ACTH stimulation test after blina is complete.    TODAY: Got lasix and bolus overnight for elevated Cr. Will monitor today. Otherwise no major changes. No suspicion for neurotoxicity. Plan to increase blina tomorrow if medically appropriate.     ONC: HR-B-ALL, therapy as per [id number] in Blina block 1 (c/b neurotoxicity necessitating pause and reduced dosing)  - Blina 5 mcg/m2/day (3/7 - ) - will continue at this dose and increase tomorrow 3/17  - s/p IV decadron q6    HEME  - TC: 8/10    ID  - Blood and urine cultures negative  - s/p CTX (3/4 - 3/5)  - Acyclovir BID [home]  - Fluconazole qD [home]  - Bactrim F/S/S [home]     NEURO  - Keppra BID [home]  - Gabapentin TID [home]    ENDO  - Synthroid qD [home]    RENAL  - RBUS WNL  - Cystatin C 1.44, renin 3.8, aldosterone < 3  - Trend BMP    ENDO  - AM Cortisol < 0.3, repeat 7.7. ACTH 109   - Endo would like ACTH stim test - to be performed after chemo  - If Mariangel has a fever (>100.4F), has some emesis and is tolerating oral intake, has mild stress: please start PO hydrocortisone 10mg q8hr until afebrile for 24 hrs and mild stress has resolved.   - If there is concern for sepsis, persistent emesis, lethargy, or unresponsiveness, please administer IV hydrocortisone 100 mg for one dose, and continue IV hydrocortisone 25 mg q6hr until improved.  - If BG is < 50 mg/dL, please confirm with a POCT glucose. If still < 50 mg/dL, please obtain an Insulin level, beta hydroxybutyrate, Growth Hormone level, and AM cortisol STAT. Once these labs are obtained, please provide Dextrose bolus to increase the glucose or other means preferred by her team to increase the BG  - 3/27: repeat TSH, free T4, total T4 via venipuncture or non-heparinized line     MSK: knee pain  - PT to f/u with patient  - Will hold off on MRI as not able to be done while on Blina   - Tylenol PRN    FENGI  - Regular diet w/ NS @ 100 cc/hr  - Sodium bicarb 10 meq BID (3/6 - )  - Famotidine BID  - Senna/Miralax PRN  - s/p Zofran/hydroxyzine PRN  - s/p PhosNak BID    11 y/o F with history of trisomy 21 and HR-B-ALL treated per [id number] in blina block 1 admitted due to c/f grade 3 neurotoxicity 2/2 blina (now resolved) with c/c/b hyperkalemia requiring PICU transfer (now resolved), JOSÉ MIGUEL, and c/f adrenal insufficiency. Patient is currently back at her neurologic baseline. Restarted blina on 3/7, at 1/3 dose (5 mcg/m2/day). Potassium remains stable. Nephrology following. Will continue to monitor. Endo consulted due to concern for adrenal insufficiency. AM cortisol< 0.3, but repeat 7.7. ACTH elevated at 109. Per discussion with endo, no physiologic steroids needed but will create stress dose plan in event of fever and/or sepsis. Plan to ACTH stimulation test after blina is complete.    TODAY: Got lasix and bolus overnight for elevated Cr. Will monitor today. Otherwise no major changes. No suspicion for neurotoxicity. Plan to increase blina tomorrow if medically appropriate.     ONC: HR-B-ALL, therapy as per [id number] in Blina block 1 (c/b neurotoxicity necessitating pause and reduced dosing)  - Blina 5 mcg/m2/day (3/7 - 3/17). >> Increase to 15 mcg/m2/day + decadron  - s/p IV decadron q6    HEME  - TC: 8/10    ID  - Blood and urine cultures negative  - s/p CTX (3/4 - 3/5)  - Acyclovir BID [home]  - Fluconazole qD [home]  - Bactrim F/S/S [home]     NEURO  - Keppra BID [home]  - Gabapentin TID [home]    ENDO  - Synthroid qD [home]    RENAL  - RBUS WNL  - Cystatin C 1.44, renin 3.8, aldosterone < 3  - Trend BMP    ENDO  - AM Cortisol < 0.3, repeat 7.7. ACTH 109   - Endo would like ACTH stim test - to be performed after chemo  - If Mariangel has a fever (>100.4F), has some emesis and is tolerating oral intake, has mild stress: please start PO hydrocortisone 10mg q8hr until afebrile for 24 hrs and mild stress has resolved.   - If there is concern for sepsis, persistent emesis, lethargy, or unresponsiveness, please administer IV hydrocortisone 100 mg for one dose, and continue IV hydrocortisone 25 mg q6hr until improved.  - If BG is < 50 mg/dL, please confirm with a POCT glucose. If still < 50 mg/dL, please obtain an Insulin level, beta hydroxybutyrate, Growth Hormone level, and AM cortisol STAT. Once these labs are obtained, please provide Dextrose bolus to increase the glucose or other means preferred by her team to increase the BG  - 3/27: repeat TSH, free T4, total T4 via venipuncture or non-heparinized line     MSK: knee pain  - PT to f/u with patient  - Will hold off on MRI as not able to be done while on Blina   - Tylenol PRN    FENGI  - Regular diet w/ NS @ 100 cc/hr  - Sodium bicarb 10 meq BID (3/6 - )  - Famotidine BID  - Senna/Miralax PRN  - s/p Zofran/hydroxyzine PRN  - s/p PhosNak BID     Labs: Monitor CBC, CMP/M/P   No